# Patient Record
Sex: FEMALE | Race: WHITE | NOT HISPANIC OR LATINO | Employment: OTHER | URBAN - METROPOLITAN AREA
[De-identification: names, ages, dates, MRNs, and addresses within clinical notes are randomized per-mention and may not be internally consistent; named-entity substitution may affect disease eponyms.]

---

## 2017-05-22 ENCOUNTER — HOSPITAL ENCOUNTER (OUTPATIENT)
Dept: MAMMOGRAPHY | Facility: CLINIC | Age: 57
Discharge: HOME | End: 2017-05-22
Attending: OBSTETRICS & GYNECOLOGY | Admitting: OBSTETRICS & GYNECOLOGY
Payer: COMMERCIAL

## 2017-05-22 DIAGNOSIS — Z12.31 VISIT FOR SCREENING MAMMOGRAM: ICD-10-CM

## 2017-05-22 PROCEDURE — G0202 SCR MAMMO BI INCL CAD: HCPCS

## 2018-06-25 ENCOUNTER — HOSPITAL ENCOUNTER (OUTPATIENT)
Dept: MAMMOGRAPHY | Facility: CLINIC | Age: 58
Discharge: HOME | End: 2018-06-25
Attending: OBSTETRICS & GYNECOLOGY | Admitting: OBSTETRICS & GYNECOLOGY
Payer: COMMERCIAL

## 2018-06-25 DIAGNOSIS — Z12.31 VISIT FOR SCREENING MAMMOGRAM: ICD-10-CM

## 2018-06-25 PROCEDURE — 77063 BREAST TOMOSYNTHESIS BI: CPT

## 2023-05-10 ENCOUNTER — LAB REQUISITION (OUTPATIENT)
Dept: LAB | Facility: CLINIC | Age: 63
End: 2023-05-10
Payer: COMMERCIAL

## 2023-05-10 DIAGNOSIS — L08.9 LOCAL INFECTION OF THE SKIN AND SUBCUTANEOUS TISSUE, UNSPECIFIED: ICD-10-CM

## 2023-05-10 PROCEDURE — 87081 CULTURE SCREEN ONLY: CPT | Mod: ORL | Performed by: DERMATOLOGY

## 2023-05-13 LAB — BACTERIA SPEC CULT: NORMAL

## 2024-07-03 ENCOUNTER — ANCILLARY PROCEDURE (OUTPATIENT)
Dept: MAMMOGRAPHY | Facility: CLINIC | Age: 64
End: 2024-07-03
Attending: FAMILY MEDICINE
Payer: COMMERCIAL

## 2024-07-03 DIAGNOSIS — Z12.31 BREAST CANCER SCREENING BY MAMMOGRAM: ICD-10-CM

## 2024-07-03 PROCEDURE — 77063 BREAST TOMOSYNTHESIS BI: CPT | Mod: TC | Performed by: RADIOLOGY

## 2024-07-03 PROCEDURE — 77067 SCR MAMMO BI INCL CAD: CPT | Mod: TC | Performed by: RADIOLOGY

## 2024-07-16 ENCOUNTER — ANCILLARY PROCEDURE (OUTPATIENT)
Dept: ULTRASOUND IMAGING | Facility: CLINIC | Age: 64
End: 2024-07-16
Attending: FAMILY MEDICINE
Payer: COMMERCIAL

## 2024-07-16 ENCOUNTER — ANCILLARY PROCEDURE (OUTPATIENT)
Dept: MAMMOGRAPHY | Facility: CLINIC | Age: 64
End: 2024-07-16
Attending: FAMILY MEDICINE
Payer: COMMERCIAL

## 2024-07-16 DIAGNOSIS — R92.8 ABNORMAL MAMMOGRAM: ICD-10-CM

## 2024-07-16 PROCEDURE — 77065 DX MAMMO INCL CAD UNI: CPT | Mod: RT

## 2024-07-16 PROCEDURE — 76642 ULTRASOUND BREAST LIMITED: CPT | Mod: RT

## 2024-07-16 PROCEDURE — G0279 TOMOSYNTHESIS, MAMMO: HCPCS

## 2024-07-22 ENCOUNTER — ANCILLARY PROCEDURE (OUTPATIENT)
Dept: MAMMOGRAPHY | Facility: CLINIC | Age: 64
End: 2024-07-22
Attending: FAMILY MEDICINE
Payer: COMMERCIAL

## 2024-07-22 ENCOUNTER — ANCILLARY PROCEDURE (OUTPATIENT)
Dept: ULTRASOUND IMAGING | Facility: CLINIC | Age: 64
End: 2024-07-22
Attending: FAMILY MEDICINE
Payer: COMMERCIAL

## 2024-07-22 DIAGNOSIS — R92.8 ABNORMAL MAMMOGRAM: ICD-10-CM

## 2024-07-22 PROCEDURE — 77066 DX MAMMO INCL CAD BI: CPT | Performed by: RADIOLOGY

## 2024-07-22 PROCEDURE — 88341 IMHCHEM/IMCYTCHM EA ADD ANTB: CPT | Performed by: PATHOLOGY

## 2024-07-22 PROCEDURE — 88360 TUMOR IMMUNOHISTOCHEM/MANUAL: CPT | Performed by: PATHOLOGY

## 2024-07-22 PROCEDURE — 88305 TISSUE EXAM BY PATHOLOGIST: CPT | Performed by: PATHOLOGY

## 2024-07-22 PROCEDURE — 88342 IMHCHEM/IMCYTCHM 1ST ANTB: CPT | Mod: XS | Performed by: PATHOLOGY

## 2024-07-22 PROCEDURE — G0279 TOMOSYNTHESIS, MAMMO: HCPCS | Performed by: RADIOLOGY

## 2024-07-22 PROCEDURE — 19083 BX BREAST 1ST LESION US IMAG: CPT | Mod: RT | Performed by: RADIOLOGY

## 2024-07-22 RX ORDER — IOPAMIDOL 755 MG/ML
90 INJECTION, SOLUTION INTRAVASCULAR ONCE
Status: COMPLETED | OUTPATIENT
Start: 2024-07-22 | End: 2024-07-22

## 2024-07-22 RX ADMIN — IOPAMIDOL 90 ML: 755 INJECTION, SOLUTION INTRAVASCULAR at 15:42

## 2024-07-25 ENCOUNTER — TELEPHONE (OUTPATIENT)
Dept: MAMMOGRAPHY | Facility: CLINIC | Age: 64
End: 2024-07-25
Payer: COMMERCIAL

## 2024-07-25 LAB
PATH REPORT.COMMENTS IMP SPEC: ABNORMAL
PATH REPORT.COMMENTS IMP SPEC: YES
PATH REPORT.FINAL DX SPEC: ABNORMAL
PATH REPORT.GROSS SPEC: ABNORMAL
PATH REPORT.MICROSCOPIC SPEC OTHER STN: ABNORMAL
PATH REPORT.RELEVANT HX SPEC: ABNORMAL
PATHOLOGY SYNOPTIC REPORT: ABNORMAL
PHOTO IMAGE: ABNORMAL

## 2024-07-26 NOTE — TELEPHONE ENCOUNTER
Spoke with patient regarding right breast biopsy results, which indicate invasive lobular carcinoma. Notified patient that the radiologist's recommendation is surgical and oncologic consultations. Patient verbalized understanding of these results and all questions answered to her satisfaction. She is scheduled for consultations with Dr. Boles and Dr. Mcintosh on 7/31.

## 2024-07-29 NOTE — TELEPHONE ENCOUNTER
RECORDS STATUS - BREAST    RECORDS REQUESTED FROM: [facility]/Kelsie   NOTES DETAILS STATUS   OFFICE NOTE from referring provider SHIVA Iglesias 6/18/24: Dr. Annetta Turk   OPERATIVE REPORT Epic 7/22/24: US Breast Bx   MEDICATION LIST [facility]    LABS     PATHOLOGY REPORTS  (Tissue diagnosis, Stage, ER/UT percentage positive and intensity of staining, HER2 IHC, FISH, and all biopsies from breast and any distant metastasis)                 Report in Epic 7/22/24: [id number]    IMAGING (NEED IMAGES & REPORT)     MAMMO PACS 7/22/24-6/25/18   ULTRASOUND PACS 7/22/24: US Breast Bx  7/16/24: US Breast

## 2024-07-31 ENCOUNTER — OFFICE VISIT (OUTPATIENT)
Dept: ONCOLOGY | Facility: CLINIC | Age: 64
End: 2024-07-31
Payer: COMMERCIAL

## 2024-07-31 ENCOUNTER — OFFICE VISIT (OUTPATIENT)
Dept: SURGERY | Facility: CLINIC | Age: 64
End: 2024-07-31
Payer: COMMERCIAL

## 2024-07-31 ENCOUNTER — PRE VISIT (OUTPATIENT)
Dept: ONCOLOGY | Facility: CLINIC | Age: 64
End: 2024-07-31

## 2024-07-31 VITALS
HEIGHT: 63 IN | BODY MASS INDEX: 28.79 KG/M2 | WEIGHT: 162.5 LBS | SYSTOLIC BLOOD PRESSURE: 128 MMHG | HEART RATE: 68 BPM | DIASTOLIC BLOOD PRESSURE: 81 MMHG

## 2024-07-31 VITALS
SYSTOLIC BLOOD PRESSURE: 128 MMHG | HEART RATE: 68 BPM | HEIGHT: 63 IN | BODY MASS INDEX: 28.79 KG/M2 | WEIGHT: 162.5 LBS | DIASTOLIC BLOOD PRESSURE: 1 MMHG

## 2024-07-31 DIAGNOSIS — Z17.0 MALIGNANT NEOPLASM OF UPPER-OUTER QUADRANT OF RIGHT BREAST IN FEMALE, ESTROGEN RECEPTOR POSITIVE (H): Primary | ICD-10-CM

## 2024-07-31 DIAGNOSIS — C50.411 MALIGNANT NEOPLASM OF UPPER-OUTER QUADRANT OF RIGHT BREAST IN FEMALE, ESTROGEN RECEPTOR POSITIVE (H): Primary | ICD-10-CM

## 2024-07-31 PROCEDURE — 99205 OFFICE O/P NEW HI 60 MIN: CPT | Performed by: SURGERY

## 2024-07-31 PROCEDURE — 99205 OFFICE O/P NEW HI 60 MIN: CPT | Performed by: INTERNAL MEDICINE

## 2024-07-31 PROCEDURE — 99417 PROLNG OP E/M EACH 15 MIN: CPT | Performed by: SURGERY

## 2024-07-31 RX ORDER — SERTRALINE HYDROCHLORIDE 25 MG/1
25 TABLET, FILM COATED ORAL DAILY
COMMUNITY

## 2024-07-31 RX ORDER — ACETAMINOPHEN 325 MG/1
975 TABLET ORAL ONCE
Status: CANCELLED | OUTPATIENT
Start: 2024-07-31 | End: 2024-07-31

## 2024-07-31 NOTE — NURSING NOTE
"Oncology Rooming Note    July 31, 2024 1:12 PM   Namita Altman is a 64 year old female who presents for:    Chief Complaint   Patient presents with    Consult     Breast cancer     Initial Vitals: BP (!) 128/1   Pulse 68   Ht 1.6 m (5' 3\")   Wt 73.7 kg (162 lb 8 oz)   BMI 28.79 kg/m   Estimated body mass index is 28.79 kg/m  as calculated from the following:    Height as of this encounter: 1.6 m (5' 3\").    Weight as of this encounter: 73.7 kg (162 lb 8 oz). Body surface area is 1.81 meters squared.  Data Unavailable Comment: Data Unavailable   No LMP recorded.  Allergies reviewed: Yes  Medications reviewed: Yes    Medications: MEDICATION REFILLS NEEDED TODAY. Provider was NOT notified.  Pharmacy name entered into [facility]: [id number] [place], [place] - [address] [place] [address]    Marli Hernandez LPN             "

## 2024-07-31 NOTE — NURSING NOTE
"Namita Altman's goals for this visit include:   Chief Complaint   Patient presents with    Consult     Invasive lobular carcinoma       She requests these members of her care team be copied on today's visit information: Yes    PCP: Annetta Turk    Referring Provider:  Referred Self, MD  No address on file    /81   Pulse 68   Ht 1.6 m (5' 3\")   Wt 73.7 kg (162 lb 8 oz)   BMI 28.79 kg/m      Do you need any medication refills at today's visit? No    Marli Hernandez LPN      "

## 2024-07-31 NOTE — NURSING NOTE
Breast Nurse Care Coordination:       I introduced self to patient and  and explained my role of breast nurse coordinator. I visited with Namita at her surgical consultation on 7/31/24 with Dr. Boles at the [facility]         Namita was given the new breast cancer patient packet which includes educational material and support resources such as American Cancer Society, Fighting Cancer through Diet and Lifestyle, [employer], [employer]'s [employer], and Pathways. I also enclosed a copy of her right breast pathology report dated 7/25/24      At the end of the consultation, we reviewed Namita's  plan of care and education. The plan is for patient to meet with  today.  Namita is leaning towards a Lumpectomy     I informed her for surgery she will need a pre-op exam within 30 days before surgery. Patient notified that surgery pathology results will be reviewed in clinic at Namita's first post operative visit with Dr. Boles about two weeks after surgery. Surgery packet reviewed with patient and surgery soap provided      I gave patient [facility] educational materials regarding Lumpectomy, and [place] Lymph Node Biopsy. Informed patient for Breast surgery, she will want to have a comfortable supportive bra that opens in the front to wear 24/7- 2 weeks following her surgery. I gave patient information on different options to purchase post surgery bras and garments     I answered her questions and encouraged patient to call me back with any future questions or concerns.  Namita has my contact information, and knows to contact me in the future with any questions or concerns.     Magalie Suh [facility]  Surgical Oncology, Plastics and General Surgery  [facility]

## 2024-07-31 NOTE — PROGRESS NOTES
"[facility]  [id number] [place] [id number]  Phone: [phone]    PATIENT NAME:  Namita Altman  PATIENT YOB: 1960    NEW SURGICAL CONSULTATION  Jul 31, 2024    Namita Altman is a 64 year old woman who presents with a right  breast complaint.  She was referred by Gemini Ferro MD.    HPI:    She notes no masses in either breast, axilla, or neck. She denies any nipple discharge or nipple inversion.     Imaging showed a 1.3 cm mass at 11:00 10 cm FN in the RIGHT breast.    A biopsy was performed and a globe clip was placed.  It showed invasive lobular carcinoma, grade 1, ER+ [place]- HER2-.      BREAST-SPECIFIC HISTORY:  Prior breast surgeries: No  Prior radiation history: No  Bra size: wears sports bra 34/36 D  Dominant hand: Right    Right upper extremity melanoma (Dr Esteves [facility] - unsure what stage) - did not have lymph nodes removed.    FAMILY HISTORY:  Breast ca: No  Ovarian ca: No  Pancreatic ca: No  Melanoma: Yes - brother  Gastric ca: No  Colon ca: No  Other cancer: No    Patient Active Problem List   Diagnosis    Malignant neoplasm of upper-outer quadrant of right breast in female, estrogen receptor positive (H)        No past medical history on file.  RA on abatacept  Psoriasis  Melanoma right upper extremity (2020)  No HTN, DM, MI, CVA    No past surgical history on file.  Endocervical bx - was under GA  No GA issues    Current Outpatient Medications   Medication Sig Dispense Refill    Abatacept (ORENCIA IV)       sertraline (ZOLOFT) 25 MG tablet Take 25 mg by mouth daily      Last received a month ago (was due for it today 7/31)       Allergies   Allergen Reactions    Penicillins         SOCIAL HISTORY:  Smokes: No  Occupation: self-employed and also works at [employer]; heavy lifting at [employer]    ROS:  Easy bruising/bleeding: No  History of DVT/PE: No    /81   Pulse 68   Ht 1.6 m (5' 3\")   Wt 73.7 kg (162 lb 8 oz)   BMI " 28.79 kg/m     Physical Exam  Constitutional:       Appearance: She is well-developed.   Chest:   Breasts:     Breasts are symmetrical.      Right: Mass present. No inverted nipple, nipple discharge, skin change or tenderness.      Left: No inverted nipple, mass, nipple discharge, skin change or tenderness.          Comments: Patient was examined in both supine and upright positions.   Lymphadenopathy:      Cervical: No cervical adenopathy.      Right cervical: No superficial, deep or posterior cervical adenopathy.     Left cervical: No superficial, deep or posterior cervical adenopathy.      Upper Body:      Right upper body: No supraclavicular, axillary or pectoral adenopathy.      Left upper body: No supraclavicular, axillary or pectoral adenopathy.      Comments: No lymphedema in bilateral upper extremities.   Skin:     General: Skin is warm and dry.          INVESTIGATIONS:    Contrast-Enhanced Mammogram (7/22/2024) showed:  FINDINGS:  BREAST DENSITY: The breasts are heterogeneously dense which may obscure small masses.  Minimal background parenchymal enhancement. No suspicious enhancement in either breast. The spiculated mass in the right breast at 11:00 posterior depth is visualized on low energy views.  IMPRESSION: BI-RADS CATEGORY: 4 - Suspicious.    Diagnostic Mammogram & Ultrasound (7/16/2024) showed:  BREAST DENSITY: The breasts are heterogeneously dense which may obscure small masses.  FINDINGS:    Additional mammographic images were obtained for further evaluation of possible asymmetry in the right breast on the CC view, lateral position. Additional views confirm an irregular hyperdense mass with angulated margins at the 11:00 position right breast, posterior depth.  Targeted right breast ultrasound was performed demonstrating an irregular hypoechoic shadowing mass with spiculated margins at the 11:00 position, 10 cm from the nipple measuring 1.3 x 0.9 x 0.7 cm. No suspicious right axillary lymph  node..  IMPRESSION: BI-RADS CATEGORY: 4 - Suspicious Abnormality-Biopsy Should Be Considered    Screening Mammogram (7/3/2024) showed:  Findings: The breasts are heterogeneously dense, which may obscure small masses.  There is a possible asymmetry in the right breast on the CC view lateral position, posterior depth. Possible correlate in superior breast on MLO view.   There is no suspicious finding of the left breast.  IMPRESSION: BI-RADS CATEGORY: 0 - Incomplete - Need additional imaging evaluation and/or prior mammograms for comparison    Biopsy (7/22/2024) showed:  Final Diagnosis   RIGHT breast, 11:00, 10 cm from nipple, ultrasound guided core biopsy:  -INVASIVE LOBULAR CARCINOMA, Emanuel grade 1  -Calcifications associated with invasive carcinoma  -Invasive carcinoma is estrogen receptor positive, progesterone receptor negative and HER2 negative (score 0) by immunohistochemistry (see biomarker reporting template below)     ASSESSMENT:  Namita Altman is a 64 year old woman with RIGHT breast cancer.    Her stage is:   Cancer Staging   Malignant neoplasm of upper-outer quadrant of right breast in female, estrogen receptor positive (H)  Staging form: Breast, AJCC 8th Edition  - Clinical: Stage IA (cT1c, cN0, cM0, G1, ER+, [place]-, HER2-) - Unsigned    I personally reviewed the imaging above.    I reviewed the imaging, diagnosis, staging, and management (including surgery, chemotherapy/systemic therapy, radiation therapy, and endocrine therapy) of breast cancer with Namita Altman and her . A copy of the biopsy pathology report was also provided.    The mainstay of treatment for resectable breast cancer is surgical resection, in the form of either breast conservation (segmental mastectomy plus radiation) or mastectomy.  We reviewed that the two strategies are equivalent in terms of overall survival.  The advantages and disadvantages of each were discussed.    The surgeries are performed as day surgeries.  Namita GORDON  "Anupama IS a candidate for breast conservation therapy.  We discussed that this involves two necessary components: the lumpectomy (or \"segmental mastectomy\"), and several weeks of whole breast radiation therapy.  We discussed that the overall survival after breast conservation therapy is identical to mastectomy and that local recurrence rates are significantly higher if segmental mastectomy was performed without subsequent radiation.  We also discussed the significance of clear margins and that a subsequent procedure may be necessary to achieve this.    Because the lesion is ill-defined, a radiofrequency identification (RFID) seed-localized approach would be taken for breast conservation.  She would present prior to surgery for an image-guided RFID seed placement.  The risks of a RFID seed-localized segmental mastectomy were discussed with the patient and family, including the risks of bleeding, wound infection, wound dehiscence, and post-operative contour change to the breast.  We discussed obtaining a supportive bra for postoperative recovery and that prescriptions for narcotics are not typically provided for this procedure.    We did not discuss the alternative of mastectomy in great detail as Namita Altman was not interested in this approach upon hearing that the survival outcomes are equivalent to breast conservation.       In addition to the surgical management of the breast, a sentinel lymph node biopsy is recommended for mounika staging of the axilla.  This is performed with the combination of the radioactive Tilmanocept and dye (lymphazurin or indocyanine green). The risks of a sentinel lymph node biopsy were discussed with the patient and family, including the risks of lymphedema (5%), bleeding, wound infection, wound dehiscence, seroma formation, and paresthesias. There is an approximately 10% false negative rate associated with sentinel lymph node biopsy as published in the literature.  The findings of the " sentinel lymph node biopsy may result in the need for further treatment. There is a 1-2% chance of patients whose sentinel lymph nodes do not map despite dual tracer (radioactive Tilmanocept and dye).     We discussed that surgical pathology results will be reviewed at the postoperative visit to allow for careful discussion of next steps and for answering questions.    Finally, we reviewed that as part of team-based approach to breast cancer, medical oncology and radiation oncology referrals may also be made to discuss systemic/endocrine therapy and radiation therapy.  The decision regarding adjuvant systemic therapy will be made after surgery, depending on Oncotype DX results. We reviewed that this can take up to 4 weeks after surgery to result.  She is meeting with Dr Mcintosh of medical oncology today. I personally discussed her case with him.    We reviewed the genetic testing guidelines by the American Society of Breast Surgeons from February 2019.  I have recommended genetic testing and counseling.  The natural history of pathogenic variants in genes like BRCA and breast cancer were discussed with the patient. Should a pathogenic variant be identified, she may  no longer be a good candidate for breast conservation.  We also reviewed the risk reduction benefits of a contralateral mastectomy in this situation. Namita Altman declined to undergo genetic testing at this time but will contact us if she changes her mind.    Finally, her abatacept will need to be held perioperatively. Surgery should be at least 4 weeks after the last dose. She will be seeing her rheumatologist next Monday.    All of the above was discussed with Namita Altman and all questions were answered.  She elected to proceed with RIGHT RFID seed-localized segmental mastectomy and axillary sentinel lymph node mapping and biopsy.    Total time spent with the patient was 60 minutes, of which 75% was counseling.     PLAN:  Hold abatacept; surgery  ideally should be at least 4 weeks after last abatacept  RIGHT RFID seed-localized segmental mastectomy   RIGHT axillary sentinel lymph node mapping and biopsy   Patient to report to her PCP for preoperative H&P and testing.  Consultation with Dr Arnaldo Boles MD [facility] FACS  Associate Professor of Surgery  Division of Surgical Oncology  [facility]     75 minutes spent on the date of the encounter doing chart review, review of outside records, review of test result(s), interpretation of test(s), patient visit, documentation, care coordination, discussion with other physician(s), and discussion with family.

## 2024-07-31 NOTE — LETTER
2024      Namita Altman  [address] [facility] [address]      Dear Colleague,    Thank you for referring your patient, Namita Altman, to the [facility] [place]. Please see a copy of my visit note below.    [facility] / Oncology  Initial Visit / Consultation Note 2024  Name: Namita Altman  :  1960  MRN:  [id number]    --------------------    Assessment / Plan:  Clinical stage IA hormone positive (ER 98% strong, SC negative), HER2 negative, invasive lobular carcinoma of the right breast  Heterogeneously dense breast tissue  Rheumatoid arthritis maintained on biologic therapy  Melanoma RUE.    Over the course of our visit, Namita, her  and I reviewed plans for upfront surgical lumpectomy with sentinel lymph node biopsy with Dr. Boles.  Following surgical resection and review of her pathology report, we reviewed the use of an Oncotype DX score to determine the role of adjuvant chemotherapy.  With her grade 1 disease as well as lobular histology, we are hopeful she will not require adjuvant chemotherapy.  The role of adjuvant radiotherapy to reduce local regional recurrence and walk through some of the potential logistics as well as side effects and reviewed the potential for omission albeit she is quite young but has concerns of radiation related toxicity..  I do not have any great concerns with her rheumatoid arthritis and the potential for radiation related issues.  Following completion of adjuvant radiotherapy, we reviewed the role of adjuvant endocrine therapy using an aromatase inhibitor for 10 years given her lobular histology.  We reviewed potential for vasomotor symptoms, arthralgias, body changes, cognitive changes, mood changes, cardiovascular changes as well as bone thinning.  Bone health we have a portion of our visit moving forward.  We will plan to see her back 3 to 4 weeks time postoperatively with a completed pathology report  "and Oncotype.    Thank you kindly for this consultation.  Clint Mcintosh MD    --------------------    Interval History:  Namita present for evaluation of breast cancer accompanied by her .  Routine screening mammogram July 2024 with a possible asymmetry in the right breast, posterior depth with no suspicious findings in the left breast.  Of note the breasts were described as heterogeneously dense breast tissue.  Diagnostic breast mammogram confirmed the irregular hyperdense mass with angulated margins at the 11 o'clock position of the right breast, posterior depth.  Targeted right breast ultrasound demonstrated an irregular, hypoechoic shadowing mass with spiculated margins at the 11 o'clock position 10 cm from the nipple measuring 1.3 x 0.9 x 0.7 cm with no suspicious right axillary lymph nodes noted.  Right breast biopsy at 11 o'clock position 10 cm from the nipple revealed grade 1 invasive lobular carcinoma with associated calcifications, estrogen receptor positive, progesterone receptor negative, HER2 negative.  Contrast-enhanced mammogram revealed heterogeneously dense breast tissue with minimal background parenchymal enhancement and no suspicious enhancement in either breast with the spiculated mass of the right breast 11 o'clock position, posterior depth showing visualization on low energy views    --------------------    Review of Systems:  10 point ROS negative except for that above.    Past Medical / Surgical History:  Patient Active Problem List   Diagnosis     Malignant neoplasm of upper-outer quadrant of right breast in female, estrogen receptor positive (H)       Family History:  Brother w/ melanoma.    Social History:  Tobacco: No.  Etoh: No.  Drugs: No.    Medications / Allergies:  Reviewed in EMR.    --------------------    Physical Exam:  VS: BP (!) 128/1   Pulse 68   Ht 1.6 m (5' 3\")   Wt 73.7 kg (162 lb 8 oz)   BMI 28.79 kg/m    GEN: Well appearing.    Labs / Imaging / " Path:  Reviewed breast bx path report  Reviewed screening and diagnostic and contrast mammogram and breast ultrasound.      Again, thank you for allowing me to participate in the care of your patient.        Sincerely,        Clint Mcintosh MD

## 2024-07-31 NOTE — LETTER
7/31/2024      Namita Altman  [address] [facility] [address]      Dear Colleague,    Thank you for referring your patient, Namita Altman, to the [facility]. Please see a copy of my visit note below.      [facility]  [id number] [place] [id number]  Phone: [phone]    PATIENT NAME:  Namita Altman  PATIENT YOB: 1960    NEW SURGICAL CONSULTATION  Jul 31, 2024    Namita Altman is a 64 year old woman who presents with a right  breast complaint.  She was referred by Gemini Ferro MD.    HPI:    She notes no masses in either breast, axilla, or neck. She denies any nipple discharge or nipple inversion.     Imaging showed a 1.3 cm mass at 11:00 10 cm FN in the RIGHT breast.    A biopsy was performed and a globe clip was placed.  It showed invasive lobular carcinoma, grade 1, ER+ TX- HER2-.      BREAST-SPECIFIC HISTORY:  Prior breast surgeries: No  Prior radiation history: No  Bra size: wears sports bra 34/36 D  Dominant hand: Right    Right upper extremity melanoma (Dr Esteves at [facility] - unsure what stage) - did not have lymph nodes removed.    FAMILY HISTORY:  Breast ca: No  Ovarian ca: No  Pancreatic ca: No  Melanoma: Yes - brother  Gastric ca: No  Colon ca: No  Other cancer: No    Patient Active Problem List   Diagnosis     Malignant neoplasm of upper-outer quadrant of right breast in female, estrogen receptor positive (H)        No past medical history on file.  RA on abatacept  Psoriasis  Melanoma right upper extremity (2020)  No HTN, DM, MI, CVA    No past surgical history on file.  Endocervical bx - was under GA  No GA issues    Current Outpatient Medications   Medication Sig Dispense Refill     Abatacept (ORENCIA IV)        sertraline (ZOLOFT) 25 MG tablet Take 25 mg by mouth daily      Last received a month ago (was due for it today 7/31)       Allergies   Allergen Reactions     Penicillins         SOCIAL  "HISTORY:  Smokes: No  Occupation: self-employed and also works at [employer]; heavy lifting at [employer]    ROS:  Easy bruising/bleeding: No  History of DVT/PE: No    /81   Pulse 68   Ht 1.6 m (5' 3\")   Wt 73.7 kg (162 lb 8 oz)   BMI 28.79 kg/m     Physical Exam  Constitutional:       Appearance: She is well-developed.   Chest:   Breasts:     Breasts are symmetrical.      Right: Mass present. No inverted nipple, nipple discharge, skin change or tenderness.      Left: No inverted nipple, mass, nipple discharge, skin change or tenderness.          Comments: Patient was examined in both supine and upright positions.   Lymphadenopathy:      Cervical: No cervical adenopathy.      Right cervical: No superficial, deep or posterior cervical adenopathy.     Left cervical: No superficial, deep or posterior cervical adenopathy.      Upper Body:      Right upper body: No supraclavicular, axillary or pectoral adenopathy.      Left upper body: No supraclavicular, axillary or pectoral adenopathy.      Comments: No lymphedema in bilateral upper extremities.   Skin:     General: Skin is warm and dry.          INVESTIGATIONS:    Contrast-Enhanced Mammogram (7/22/2024) showed:  FINDINGS:  BREAST DENSITY: The breasts are heterogeneously dense which may obscure small masses.  Minimal background parenchymal enhancement. No suspicious enhancement in either breast. The spiculated mass in the right breast at 11:00 posterior depth is visualized on low energy views.  IMPRESSION: BI-RADS CATEGORY: 4 - Suspicious.    Diagnostic Mammogram & Ultrasound (7/16/2024) showed:  BREAST DENSITY: The breasts are heterogeneously dense which may obscure small masses.  FINDINGS:    Additional mammographic images were obtained for further evaluation of possible asymmetry in the right breast on the CC view, lateral position. Additional views confirm an irregular hyperdense mass with angulated margins at the 11:00 position right breast, posterior " depth.  Targeted right breast ultrasound was performed demonstrating an irregular hypoechoic shadowing mass with spiculated margins at the 11:00 position, 10 cm from the nipple measuring 1.3 x 0.9 x 0.7 cm. No suspicious right axillary lymph node..  IMPRESSION: BI-RADS CATEGORY: 4 - Suspicious Abnormality-Biopsy Should Be Considered    Screening Mammogram (7/3/2024) showed:  Findings: The breasts are heterogeneously dense, which may obscure small masses.  There is a possible asymmetry in the right breast on the CC view lateral position, posterior depth. Possible correlate in superior breast on MLO view.   There is no suspicious finding of the left breast.  IMPRESSION: BI-RADS CATEGORY: 0 - Incomplete - Need additional imaging evaluation and/or prior mammograms for comparison    Biopsy (7/22/2024) showed:  Final Diagnosis   RIGHT breast, 11:00, 10 cm from nipple, ultrasound guided core biopsy:  -INVASIVE LOBULAR CARCINOMA, Emanuel grade 1  -Calcifications associated with invasive carcinoma  -Invasive carcinoma is estrogen receptor positive, progesterone receptor negative and HER2 negative (score 0) by immunohistochemistry (see biomarker reporting template below)     ASSESSMENT:  Namita Altman is a 64 year old woman with RIGHT breast cancer.    Her stage is:   Cancer Staging   Malignant neoplasm of upper-outer quadrant of right breast in female, estrogen receptor positive (H)  Staging form: Breast, AJCC 8th Edition  - Clinical: Stage IA (cT1c, cN0, cM0, G1, ER+, AR-, HER2-) - Unsigned    I personally reviewed the imaging above.    I reviewed the imaging, diagnosis, staging, and management (including surgery, chemotherapy/systemic therapy, radiation therapy, and endocrine therapy) of breast cancer with Namita Altman and her . A copy of the biopsy pathology report was also provided.    The mainstay of treatment for resectable breast cancer is surgical resection, in the form of either breast conservation  "(segmental mastectomy plus radiation) or mastectomy.  We reviewed that the two strategies are equivalent in terms of overall survival.  The advantages and disadvantages of each were discussed.    The surgeries are performed as day surgeries.  Namita Altman IS a candidate for breast conservation therapy.  We discussed that this involves two necessary components: the lumpectomy (or \"segmental mastectomy\"), and several weeks of whole breast radiation therapy.  We discussed that the overall survival after breast conservation therapy is identical to mastectomy and that local recurrence rates are significantly higher if segmental mastectomy was performed without subsequent radiation.  We also discussed the significance of clear margins and that a subsequent procedure may be necessary to achieve this.    Because the lesion is ill-defined, a radiofrequency identification (RFID) seed-localized approach would be taken for breast conservation.  She would present prior to surgery for an image-guided RFID seed placement.  The risks of a RFID seed-localized segmental mastectomy were discussed with the patient and family, including the risks of bleeding, wound infection, wound dehiscence, and post-operative contour change to the breast.  We discussed obtaining a supportive bra for postoperative recovery and that prescriptions for narcotics are not typically provided for this procedure.    We did not discuss the alternative of mastectomy in great detail as Namita Altman was not interested in this approach upon hearing that the survival outcomes are equivalent to breast conservation.       In addition to the surgical management of the breast, a sentinel lymph node biopsy is recommended for mounika staging of the axilla.  This is performed with the combination of the radioactive Tilmanocept and dye (lymphazurin or indocyanine green). The risks of a sentinel lymph node biopsy were discussed with the patient and family, including the risks " of lymphedema (5%), bleeding, wound infection, wound dehiscence, seroma formation, and paresthesias. There is an approximately 10% false negative rate associated with sentinel lymph node biopsy as published in the literature.  The findings of the sentinel lymph node biopsy may result in the need for further treatment. There is a 1-2% chance of patients whose sentinel lymph nodes do not map despite dual tracer (radioactive Tilmanocept and dye).     We discussed that surgical pathology results will be reviewed at the postoperative visit to allow for careful discussion of next steps and for answering questions.    Finally, we reviewed that as part of team-based approach to breast cancer, medical oncology and radiation oncology referrals may also be made to discuss systemic/endocrine therapy and radiation therapy.  The decision regarding adjuvant systemic therapy will be made after surgery, depending on Oncotype DX results. We reviewed that this can take up to 4 weeks after surgery to result.  She is meeting with Dr Mcintosh of medical oncology today. I personally discussed her case with him.    We reviewed the genetic testing guidelines by the American Society of Breast Surgeons from February 2019.  I have recommended genetic testing and counseling.  The natural history of pathogenic variants in genes like BRCA and breast cancer were discussed with the patient. Should a pathogenic variant be identified, she may  no longer be a good candidate for breast conservation.  We also reviewed the risk reduction benefits of a contralateral mastectomy in this situation. Namita Altman declined to undergo genetic testing at this time but will contact us if she changes her mind.    Finally, her abatacept will need to be held perioperatively. Surgery should be at least 4 weeks after the last dose. She will be seeing her rheumatologist next Monday.    All of the above was discussed with Namita GORDON Anupama and all questions were answered.   She elected to proceed with RIGHT RFID seed-localized segmental mastectomy and axillary sentinel lymph node mapping and biopsy.    Total time spent with the patient was 60 minutes, of which 75% was counseling.     PLAN:  Hold abatacept; surgery ideally should be at least 4 weeks after last abatacept  RIGHT RFID seed-localized segmental mastectomy   RIGHT axillary sentinel lymph node mapping and biopsy   Patient to report to her PCP for preoperative H&P and testing.  Consultation with Dr Arnaldo Boles MD MS Located within [facility] FACS  Associate Professor of Surgery  Division of Surgical Oncology  [facility]     75 minutes spent on the date of the encounter doing chart review, review of outside records, review of test result(s), interpretation of test(s), patient visit, documentation, care coordination, discussion with other physician(s), and discussion with family.      Again, thank you for allowing me to participate in the care of your patient.        Sincerely,        Magdalena Boles MD

## 2024-08-02 ENCOUNTER — TELEPHONE (OUTPATIENT)
Dept: ONCOLOGY | Facility: CLINIC | Age: 64
End: 2024-08-02
Payer: COMMERCIAL

## 2024-08-02 NOTE — PROGRESS NOTES
[facility] Hematology / Oncology  Initial Visit / Consultation Note 2024  Name: Namita Altman  :  1960  MRN:  [id number]    --------------------    Assessment / Plan:  Clinical stage IA hormone positive (ER 98% strong, PA negative), HER2 negative, invasive lobular carcinoma of the right breast  Heterogeneously dense breast tissue  Rheumatoid arthritis maintained on biologic therapy  Melanoma RUE.    Over the course of our visit, Namita, her  and I reviewed plans for upfront surgical lumpectomy with sentinel lymph node biopsy with Dr. Boles.  Following surgical resection and review of her pathology report, we reviewed the use of an Oncotype DX score to determine the role of adjuvant chemotherapy.  With her grade 1 disease as well as lobular histology, we are hopeful she will not require adjuvant chemotherapy.  The role of adjuvant radiotherapy to reduce local regional recurrence and walk through some of the potential logistics as well as side effects and reviewed the potential for omission albeit she is quite young but has concerns of radiation related toxicity..  I do not have any great concerns with her rheumatoid arthritis and the potential for radiation related issues.  Following completion of adjuvant radiotherapy, we reviewed the role of adjuvant endocrine therapy using an aromatase inhibitor for 10 years given her lobular histology.  We reviewed potential for vasomotor symptoms, arthralgias, body changes, cognitive changes, mood changes, cardiovascular changes as well as bone thinning.  Bone health we have a portion of our visit moving forward.  We will plan to see her back 3 to 4 weeks time postoperatively with a completed pathology report and Oncotype.    Thank you kindly for this consultation.  Clint Mcintosh MD    --------------------    Interval History:  Namita present for evaluation of breast cancer accompanied by her .  Routine screening mammogram 2024 with a  "possible asymmetry in the right breast, posterior depth with no suspicious findings in the left breast.  Of note the breasts were described as heterogeneously dense breast tissue.  Diagnostic breast mammogram confirmed the irregular hyperdense mass with angulated margins at the 11 o'clock position of the right breast, posterior depth.  Targeted right breast ultrasound demonstrated an irregular, hypoechoic shadowing mass with spiculated margins at the 11 o'clock position 10 cm from the nipple measuring 1.3 x 0.9 x 0.7 cm with no suspicious right axillary lymph nodes noted.  Right breast biopsy at 11 o'clock position 10 cm from the nipple revealed grade 1 invasive lobular carcinoma with associated calcifications, estrogen receptor positive, progesterone receptor negative, HER2 negative.  Contrast-enhanced mammogram revealed heterogeneously dense breast tissue with minimal background parenchymal enhancement and no suspicious enhancement in either breast with the spiculated mass of the right breast 11 o'clock position, posterior depth showing visualization on low energy views    --------------------    Review of Systems:  10 point ROS negative except for that above.    Past Medical / Surgical History:  Patient Active Problem List   Diagnosis    Malignant neoplasm of upper-outer quadrant of right breast in female, estrogen receptor positive (H)       Family History:  Brother w/ melanoma.    Social History:  Tobacco: No.  Etoh: No.  Drugs: No.    Medications / Allergies:  Reviewed in EMR.    --------------------    Physical Exam:  VS: BP (!) 128/1   Pulse 68   Ht 1.6 m (5' 3\")   Wt 73.7 kg (162 lb 8 oz)   BMI 28.79 kg/m    GEN: Well appearing.    Labs / Imaging / Path:  Reviewed breast bx path report  Reviewed screening and diagnostic and contrast mammogram and breast ultrasound.  "

## 2024-08-02 NOTE — TELEPHONE ENCOUNTER
Namita called back to schedule surgery with Dr. Boles.  Namita requested to speak with Magalie.  She would like to be called back preferably today at [phone].      Trudi Gregory on 8/2/2024 at 1:45 PM

## 2024-08-02 NOTE — TELEPHONE ENCOUNTER
Called and LVM for patient to schedule surgery with Dr. Boles. Provided direct call back number [phone].      Gemini Don on 8/2/2024 at 10:42 AM

## 2024-08-02 NOTE — TELEPHONE ENCOUNTER
Called patient and LVM to return surgery scheduling call at phone number at [phone] to schedule with Dr. Boles.    Gemini Don on 8/2/2024 at 4:08 PM

## 2024-08-05 NOTE — TELEPHONE ENCOUNTER
Called patient to schedule surgery with Dr. Boles    Spoke with:  Patient    Date of Surgery: 8/27/24    Arrival time Discussed with Patient:  No    Location of surgery: [facility]     Pre-Op H&P: With PCP Dr. Turk within 30 days of the surgery date    Post-Op Appts: Unable to schedule at this time - see notes     Imaging:  No - N/A  Scheduled: N/A     Discussed with patient pre-op RN will call 2-3 days prior to surgery with arrival time and instructions:  Yes     Packet sent out: No  via Received in clinic by RN  [DATE] 7/31/24    Surgical Soap: Receiving via Mail - Standard      Informed patient that they will need an adult  to bring patient home from surgery: Yes  : Family member       Additional Comments:  Patient was told she could have surgery on 8/28-8/30. Explained to patient that the provider requested another patient be scheduled on 8/28 so it was no longer available. Explained 8/29 is a clinic date and provider does not operate on Fridays. Offered 8/27 to patient but patient was attempting to fly back home that date from moving her daughter into college. Offered 9/4 but patient was under the impression that she could not be done in September. Patient was frustrated that 8/28 was not an option for her, and requested to be scheduled 8/27 but see if 9/4 is an option. Will need to wait to schedule NM, tag, and post-op until we know if patients surgery date will change. Message sent to provider.       All patients questions were answered and patient was instructed to review surgical packet and call back with any questions or concerns.       Gemini Don on 8/5/2024 at 9:55 AM

## 2024-08-06 NOTE — TELEPHONE ENCOUNTER
Patient called in and asked to reschedule her surgery to 9/4/24 instead of 8/27/24 just in case something happens with her flight.     Surgery rescheduled to 9/4/24 in [place].    Message sent to breast center to reschedule NM injection to be completed in [place].    Post-op rescheduled to 9/18/24 in [place] at 4:30 PM.    No further questions at this time.    Gemini Don on 8/6/2024 at 12:25 PM

## 2024-08-06 NOTE — TELEPHONE ENCOUNTER
Called patient back to finish scheduling appointments.    Post-op scheduled for 9/11/24 at 4:30 PM in [place].    Breast nurse notified to reach out to patient to help schedule tag placement.     NM injection to be delivered to the ASC OR for surgeon to inject.     Patient plans on flying home on 8/27/24 in the morning and would not be able to come earlier for an injection from the breast center.     Patients  will drive her home.    Patient has no further questions/concerns at this time.    Gemini Don on 8/6/2024 at 9:40 AM

## 2024-08-08 ENCOUNTER — TELEPHONE (OUTPATIENT)
Dept: SURGERY | Facility: CLINIC | Age: 64
End: 2024-08-08
Payer: COMMERCIAL

## 2024-08-08 NOTE — TELEPHONE ENCOUNTER
Pt called to discuss appointments to confirm she understood them all. RN reviewed appts below and surgery date and time. Sent activation code to pt to get signed up for mychart. No further questions at this time per pt.Magalie Suh RN

## 2024-08-09 ENCOUNTER — MYC MEDICAL ADVICE (OUTPATIENT)
Dept: SURGERY | Facility: CLINIC | Age: 64
End: 2024-08-09
Payer: COMMERCIAL

## 2024-08-16 NOTE — CONFIDENTIAL NOTE
Called pt to review mychart message below. Pt states she had her last Abatacept infusion on 8/5/24 and has entered all her appts in her calendar. No further questions at this time..Magalie Suh RN

## 2024-08-19 ENCOUNTER — ANCILLARY PROCEDURE (OUTPATIENT)
Dept: ULTRASOUND IMAGING | Facility: CLINIC | Age: 64
End: 2024-08-19
Attending: SURGERY
Payer: COMMERCIAL

## 2024-08-19 ENCOUNTER — ANCILLARY PROCEDURE (OUTPATIENT)
Dept: MAMMOGRAPHY | Facility: CLINIC | Age: 64
End: 2024-08-19
Attending: SURGERY
Payer: COMMERCIAL

## 2024-08-19 DIAGNOSIS — Z17.0 MALIGNANT NEOPLASM OF UPPER-OUTER QUADRANT OF RIGHT BREAST IN FEMALE, ESTROGEN RECEPTOR POSITIVE (H): ICD-10-CM

## 2024-08-19 DIAGNOSIS — C50.411 MALIGNANT NEOPLASM OF UPPER-OUTER QUADRANT OF RIGHT BREAST IN FEMALE, ESTROGEN RECEPTOR POSITIVE (H): ICD-10-CM

## 2024-08-19 PROCEDURE — 19285 PERQ DEV BREAST 1ST US IMAG: CPT | Mod: RT | Performed by: RADIOLOGY

## 2024-09-02 ENCOUNTER — ANESTHESIA EVENT (OUTPATIENT)
Dept: SURGERY | Facility: AMBULATORY SURGERY CENTER | Age: 64
End: 2024-09-02
Payer: COMMERCIAL

## 2024-09-02 NOTE — ANESTHESIA PREPROCEDURE EVALUATION
"Anesthesia Pre-Procedure Evaluation    Patient: Namita Altman   MRN: [id number] : 1960        Procedure : Procedure(s):  RIGHT Radiofrequency Identification Seed-Localized Segmental Mastectomy (=\"Lumpectomy\"), RIGHT Axillary [place] Lymph Node Biopsy          No past medical history on file.   History reviewed. No pertinent surgical history.   Allergies   Allergen Reactions    Penicillins       Social History     Tobacco Use    Smoking status: Not on file    Smokeless tobacco: Not on file   Substance Use Topics    Alcohol use: Not on file      Wt Readings from Last 1 Encounters:   24 73.7 kg (162 lb 8 oz)              OUTSIDE LABS:  CBC: No results found for: \"WBC\", \"HGB\", \"HCT\", \"PLT\"  BMP: No results found for: \"NA\", \"POTASSIUM\", \"CHLORIDE\", \"CO2\", \"BUN\", \"CR\", \"GLC\"  COAGS: No results found for: \"PTT\", \"INR\", \"FIBR\"  POC: No results found for: \"BGM\", \"HCG\", \"HCGS\"  HEPATIC: No results found for: \"ALBUMIN\", \"PROTTOTAL\", \"ALT\", \"AST\", \"GGT\", \"ALKPHOS\", \"BILITOTAL\", \"BILIDIRECT\", \"TERRI\"  OTHER: No results found for: \"PH\", \"LACT\", \"A1C\", \"KEREN\", \"PHOS\", \"MAG\", \"LIPASE\", \"AMYLASE\", \"TSH\", \"T4\", \"T3\", \"CRP\", \"SED\"    Anesthesia Plan    ASA Status:  2       Anesthesia Type: General.     - Airway: LMA   Induction: Intravenous, Propofol.   Maintenance: Balanced.        Consents    Anesthesia Plan(s) and associated risks, benefits, and realistic alternatives discussed. Questions answered and patient/representative(s) expressed understanding.     - Discussed: Risks, Benefits and Alternatives for BOTH SEDATION and the PROCEDURE were discussed     - Discussed with:       - Extended Intubation/Ventilatory Support Discussed: No.      - Patient is DNR/DNI Status: No     Use of blood products discussed: No .     Postoperative Care    Pain management: IV analgesics, Oral pain medications, Multi-modal analgesia, Peripheral nerve block (Single Shot).   PONV prophylaxis: Dexamethasone or Solumedrol, Ondansetron (or " other 5HT-3), Background Propofol Infusion     Comments:               Homer Laurent MD    I have reviewed the pertinent notes and labs in the chart from the past 30 days and (re)examined the patient.  Any updates or changes from those notes are reflected in this note.

## 2024-09-03 ENCOUNTER — TELEPHONE (OUTPATIENT)
Dept: SURGERY | Facility: CLINIC | Age: 64
End: 2024-09-03
Payer: COMMERCIAL

## 2024-09-03 NOTE — TELEPHONE ENCOUNTER
Patient called and left message on department VM today at 11:55am. She states she is having a lumpectomy tomorrow 9/4/24 with Dr. Boles and she is getting conflicting arrival times. The nurse told her 7:00am but the text stated 8:00am    Would like a call back to confirm.    Marli Hernandez LPN    
Spoke with pt who states someone reached out to her from the surgery center so she is good to go and has no further questions..Magalie Suh RN    
MAR

## 2024-09-03 NOTE — TELEPHONE ENCOUNTER
Forms completed and faxed to the number provided on the FMLA forms. Checked Right fax and fax was sent sucessfully..Magalie Suh RN

## 2024-09-03 NOTE — TELEPHONE ENCOUNTER
FMLA received via fax. Right breast lumpectomy DOS 9/4/24. Forms in surgery bin.    Marli Hernandez LPN

## 2024-09-04 ENCOUNTER — ANESTHESIA (OUTPATIENT)
Dept: SURGERY | Facility: AMBULATORY SURGERY CENTER | Age: 64
End: 2024-09-04
Payer: COMMERCIAL

## 2024-09-04 ENCOUNTER — ANCILLARY PROCEDURE (OUTPATIENT)
Dept: MAMMOGRAPHY | Facility: CLINIC | Age: 64
End: 2024-09-04
Attending: SURGERY
Payer: COMMERCIAL

## 2024-09-04 ENCOUNTER — HOSPITAL ENCOUNTER (OUTPATIENT)
Facility: AMBULATORY SURGERY CENTER | Age: 64
Discharge: HOME | End: 2024-09-04
Attending: SURGERY | Admitting: SURGERY
Payer: COMMERCIAL

## 2024-09-04 ENCOUNTER — ANCILLARY PROCEDURE (OUTPATIENT)
Dept: NUCLEAR MEDICINE | Facility: CLINIC | Age: 64
End: 2024-09-04
Attending: SURGERY
Payer: COMMERCIAL

## 2024-09-04 VITALS
DIASTOLIC BLOOD PRESSURE: 77 MMHG | RESPIRATION RATE: 10 BRPM | HEART RATE: 83 BPM | BODY MASS INDEX: 28.79 KG/M2 | TEMPERATURE: 97 F | WEIGHT: 162.5 LBS | OXYGEN SATURATION: 95 % | SYSTOLIC BLOOD PRESSURE: 101 MMHG

## 2024-09-04 DIAGNOSIS — C50.411 MALIGNANT NEOPLASM OF UPPER-OUTER QUADRANT OF RIGHT BREAST IN FEMALE, ESTROGEN RECEPTOR POSITIVE (H): ICD-10-CM

## 2024-09-04 DIAGNOSIS — Z17.0 MALIGNANT NEOPLASM OF UPPER-OUTER QUADRANT OF RIGHT BREAST IN FEMALE, ESTROGEN RECEPTOR POSITIVE (H): ICD-10-CM

## 2024-09-04 PROCEDURE — G8907 PT DOC NO EVENTS ON DISCHARG: HCPCS

## 2024-09-04 PROCEDURE — 38792 RA TRACER ID OF SENTINL NODE: CPT | Performed by: RADIOLOGY

## 2024-09-04 PROCEDURE — A9520 TC99 TILMANOCEPT DIAG 0.5MCI: HCPCS | Performed by: RADIOLOGY

## 2024-09-04 PROCEDURE — 19301 PARTIAL MASTECTOMY: CPT | Mod: RT | Performed by: SURGERY

## 2024-09-04 PROCEDURE — 19301 PARTIAL MASTECTOMY: CPT | Performed by: STUDENT IN AN ORGANIZED HEALTH CARE EDUCATION/TRAINING PROGRAM

## 2024-09-04 PROCEDURE — 88307 TISSUE EXAM BY PATHOLOGIST: CPT | Performed by: PATHOLOGY

## 2024-09-04 PROCEDURE — 38525 BIOPSY/REMOVAL LYMPH NODES: CPT | Mod: RT | Performed by: SURGERY

## 2024-09-04 PROCEDURE — 38525 BIOPSY/REMOVAL LYMPH NODES: CPT

## 2024-09-04 PROCEDURE — 76098 X-RAY EXAM SURGICAL SPECIMEN: CPT

## 2024-09-04 PROCEDURE — 88342 IMHCHEM/IMCYTCHM 1ST ANTB: CPT | Performed by: PATHOLOGY

## 2024-09-04 PROCEDURE — 19301 PARTIAL MASTECTOMY: CPT | Mod: RT

## 2024-09-04 PROCEDURE — 38900 IO MAP OF SENT LYMPH NODE: CPT | Mod: RT | Performed by: SURGERY

## 2024-09-04 PROCEDURE — 19301 PARTIAL MASTECTOMY: CPT | Performed by: NURSE ANESTHETIST, CERTIFIED REGISTERED

## 2024-09-04 PROCEDURE — G8916 PT W IV AB GIVEN ON TIME: HCPCS

## 2024-09-04 RX ORDER — METOPROLOL TARTRATE 1 MG/ML
1-2 INJECTION, SOLUTION INTRAVENOUS EVERY 5 MIN PRN
Status: DISCONTINUED | OUTPATIENT
Start: 2024-09-04 | End: 2024-09-05 | Stop reason: HOSPADM

## 2024-09-04 RX ORDER — FENTANYL CITRATE 50 UG/ML
25 INJECTION, SOLUTION INTRAMUSCULAR; INTRAVENOUS
Status: DISCONTINUED | OUTPATIENT
Start: 2024-09-04 | End: 2024-09-05 | Stop reason: HOSPADM

## 2024-09-04 RX ORDER — NALOXONE HYDROCHLORIDE 0.4 MG/ML
0.1 INJECTION, SOLUTION INTRAMUSCULAR; INTRAVENOUS; SUBCUTANEOUS
Status: DISCONTINUED | OUTPATIENT
Start: 2024-09-04 | End: 2024-09-05 | Stop reason: HOSPADM

## 2024-09-04 RX ORDER — SODIUM CHLORIDE, SODIUM LACTATE, POTASSIUM CHLORIDE, CALCIUM CHLORIDE 600; 310; 30; 20 MG/100ML; MG/100ML; MG/100ML; MG/100ML
INJECTION, SOLUTION INTRAVENOUS CONTINUOUS
Status: DISCONTINUED | OUTPATIENT
Start: 2024-09-04 | End: 2024-09-05 | Stop reason: HOSPADM

## 2024-09-04 RX ORDER — NALOXONE HYDROCHLORIDE 0.4 MG/ML
0.4 INJECTION, SOLUTION INTRAMUSCULAR; INTRAVENOUS; SUBCUTANEOUS
Status: DISCONTINUED | OUTPATIENT
Start: 2024-09-04 | End: 2024-09-05 | Stop reason: HOSPADM

## 2024-09-04 RX ORDER — OXYCODONE HYDROCHLORIDE 5 MG/1
5 TABLET ORAL EVERY 4 HOURS PRN
Status: DISCONTINUED | OUTPATIENT
Start: 2024-09-04 | End: 2024-09-05 | Stop reason: HOSPADM

## 2024-09-04 RX ORDER — NALOXONE HYDROCHLORIDE 0.4 MG/ML
0.2 INJECTION, SOLUTION INTRAMUSCULAR; INTRAVENOUS; SUBCUTANEOUS
Status: DISCONTINUED | OUTPATIENT
Start: 2024-09-04 | End: 2024-09-05 | Stop reason: HOSPADM

## 2024-09-04 RX ORDER — CEFAZOLIN SODIUM 2 G/50ML
2 SOLUTION INTRAVENOUS
Status: COMPLETED | OUTPATIENT
Start: 2024-09-04 | End: 2024-09-04

## 2024-09-04 RX ORDER — ACETAMINOPHEN 325 MG/1
975 TABLET ORAL ONCE
Status: COMPLETED | OUTPATIENT
Start: 2024-09-04 | End: 2024-09-04

## 2024-09-04 RX ORDER — OXYCODONE HYDROCHLORIDE 5 MG/1
10 TABLET ORAL EVERY 4 HOURS PRN
Status: DISCONTINUED | OUTPATIENT
Start: 2024-09-04 | End: 2024-09-05 | Stop reason: HOSPADM

## 2024-09-04 RX ORDER — ONDANSETRON 2 MG/ML
4 INJECTION INTRAMUSCULAR; INTRAVENOUS EVERY 30 MIN PRN
Status: DISCONTINUED | OUTPATIENT
Start: 2024-09-04 | End: 2024-09-05 | Stop reason: HOSPADM

## 2024-09-04 RX ORDER — HYDRALAZINE HYDROCHLORIDE 20 MG/ML
2.5-5 INJECTION INTRAMUSCULAR; INTRAVENOUS EVERY 10 MIN PRN
Status: DISCONTINUED | OUTPATIENT
Start: 2024-09-04 | End: 2024-09-05 | Stop reason: HOSPADM

## 2024-09-04 RX ORDER — FLUMAZENIL 0.1 MG/ML
0.2 INJECTION, SOLUTION INTRAVENOUS
Status: DISCONTINUED | OUTPATIENT
Start: 2024-09-04 | End: 2024-09-05 | Stop reason: HOSPADM

## 2024-09-04 RX ORDER — ONDANSETRON 2 MG/ML
INJECTION INTRAMUSCULAR; INTRAVENOUS PRN
Status: DISCONTINUED | OUTPATIENT
Start: 2024-09-04 | End: 2024-09-04

## 2024-09-04 RX ORDER — GLYCOPYRROLATE 0.2 MG/ML
INJECTION, SOLUTION INTRAMUSCULAR; INTRAVENOUS PRN
Status: DISCONTINUED | OUTPATIENT
Start: 2024-09-04 | End: 2024-09-04

## 2024-09-04 RX ORDER — ONDANSETRON 4 MG/1
4 TABLET, ORALLY DISINTEGRATING ORAL EVERY 30 MIN PRN
Status: DISCONTINUED | OUTPATIENT
Start: 2024-09-04 | End: 2024-09-05 | Stop reason: HOSPADM

## 2024-09-04 RX ORDER — ISOSULFAN BLUE 50 MG/5ML
INJECTION, SOLUTION SUBCUTANEOUS PRN
Status: DISCONTINUED | OUTPATIENT
Start: 2024-09-04 | End: 2024-09-04 | Stop reason: HOSPADM

## 2024-09-04 RX ORDER — DEXAMETHASONE SODIUM PHOSPHATE 4 MG/ML
INJECTION, SOLUTION INTRA-ARTICULAR; INTRALESIONAL; INTRAMUSCULAR; INTRAVENOUS; SOFT TISSUE PRN
Status: DISCONTINUED | OUTPATIENT
Start: 2024-09-04 | End: 2024-09-04

## 2024-09-04 RX ORDER — LIDOCAINE 40 MG/G
CREAM TOPICAL
Status: DISCONTINUED | OUTPATIENT
Start: 2024-09-04 | End: 2024-09-05 | Stop reason: HOSPADM

## 2024-09-04 RX ORDER — DEXAMETHASONE SODIUM PHOSPHATE 4 MG/ML
4 INJECTION, SOLUTION INTRA-ARTICULAR; INTRALESIONAL; INTRAMUSCULAR; INTRAVENOUS; SOFT TISSUE
Status: DISCONTINUED | OUTPATIENT
Start: 2024-09-04 | End: 2024-09-05 | Stop reason: HOSPADM

## 2024-09-04 RX ORDER — FENTANYL CITRATE 50 UG/ML
50 INJECTION, SOLUTION INTRAMUSCULAR; INTRAVENOUS EVERY 5 MIN PRN
Status: DISCONTINUED | OUTPATIENT
Start: 2024-09-04 | End: 2024-09-05 | Stop reason: HOSPADM

## 2024-09-04 RX ORDER — FENTANYL CITRATE 50 UG/ML
25 INJECTION, SOLUTION INTRAMUSCULAR; INTRAVENOUS EVERY 5 MIN PRN
Status: DISCONTINUED | OUTPATIENT
Start: 2024-09-04 | End: 2024-09-05 | Stop reason: HOSPADM

## 2024-09-04 RX ORDER — LIDOCAINE HYDROCHLORIDE 20 MG/ML
INJECTION, SOLUTION INFILTRATION; PERINEURAL PRN
Status: DISCONTINUED | OUTPATIENT
Start: 2024-09-04 | End: 2024-09-04

## 2024-09-04 RX ORDER — BUPIVACAINE HYDROCHLORIDE 2.5 MG/ML
INJECTION, SOLUTION EPIDURAL; INFILTRATION; INTRACAUDAL
Status: DISCONTINUED | OUTPATIENT
Start: 2024-09-04 | End: 2024-09-04

## 2024-09-04 RX ORDER — PROPOFOL 10 MG/ML
INJECTION, EMULSION INTRAVENOUS PRN
Status: DISCONTINUED | OUTPATIENT
Start: 2024-09-04 | End: 2024-09-04

## 2024-09-04 RX ORDER — CEFAZOLIN SODIUM 2 G/50ML
2 SOLUTION INTRAVENOUS SEE ADMIN INSTRUCTIONS
Status: DISCONTINUED | OUTPATIENT
Start: 2024-09-04 | End: 2024-09-05 | Stop reason: HOSPADM

## 2024-09-04 RX ORDER — FENTANYL CITRATE 50 UG/ML
25-50 INJECTION, SOLUTION INTRAMUSCULAR; INTRAVENOUS
Status: DISCONTINUED | OUTPATIENT
Start: 2024-09-04 | End: 2024-09-05 | Stop reason: HOSPADM

## 2024-09-04 RX ADMIN — FENTANYL CITRATE 25 MCG: 50 INJECTION, SOLUTION INTRAMUSCULAR; INTRAVENOUS at 10:30

## 2024-09-04 RX ADMIN — CEFAZOLIN SODIUM 2 G: 2 SOLUTION INTRAVENOUS at 09:36

## 2024-09-04 RX ADMIN — GLYCOPYRROLATE 0.1 MG: 0.2 INJECTION, SOLUTION INTRAMUSCULAR; INTRAVENOUS at 09:59

## 2024-09-04 RX ADMIN — DEXAMETHASONE SODIUM PHOSPHATE 4 MG: 4 INJECTION, SOLUTION INTRA-ARTICULAR; INTRALESIONAL; INTRAMUSCULAR; INTRAVENOUS; SOFT TISSUE at 09:50

## 2024-09-04 RX ADMIN — FENTANYL CITRATE 25 MCG: 50 INJECTION, SOLUTION INTRAMUSCULAR; INTRAVENOUS at 10:59

## 2024-09-04 RX ADMIN — PROPOFOL 180 MG: 10 INJECTION, EMULSION INTRAVENOUS at 09:41

## 2024-09-04 RX ADMIN — FENTANYL CITRATE 50 MCG: 50 INJECTION, SOLUTION INTRAMUSCULAR; INTRAVENOUS at 08:46

## 2024-09-04 RX ADMIN — FENTANYL CITRATE 25 MCG: 50 INJECTION, SOLUTION INTRAMUSCULAR; INTRAVENOUS at 09:50

## 2024-09-04 RX ADMIN — ONDANSETRON 4 MG: 2 INJECTION INTRAMUSCULAR; INTRAVENOUS at 09:50

## 2024-09-04 RX ADMIN — FENTANYL CITRATE 25 MCG: 50 INJECTION, SOLUTION INTRAMUSCULAR; INTRAVENOUS at 10:49

## 2024-09-04 RX ADMIN — LIDOCAINE HYDROCHLORIDE 80 MG: 20 INJECTION, SOLUTION INFILTRATION; PERINEURAL at 09:41

## 2024-09-04 RX ADMIN — GLYCOPYRROLATE 0.1 MG: 0.2 INJECTION, SOLUTION INTRAMUSCULAR; INTRAVENOUS at 09:57

## 2024-09-04 RX ADMIN — SODIUM CHLORIDE, SODIUM LACTATE, POTASSIUM CHLORIDE, CALCIUM CHLORIDE: 600; 310; 30; 20 INJECTION, SOLUTION INTRAVENOUS at 07:49

## 2024-09-04 RX ADMIN — PROPOFOL 50 MCG/KG/MIN: 10 INJECTION, EMULSION INTRAVENOUS at 09:44

## 2024-09-04 RX ADMIN — ACETAMINOPHEN 975 MG: 325 TABLET ORAL at 07:40

## 2024-09-04 RX ADMIN — BUPIVACAINE HYDROCHLORIDE 20 ML: 2.5 INJECTION, SOLUTION EPIDURAL; INFILTRATION; INTRACAUDAL at 08:50

## 2024-09-04 NOTE — OP NOTE
PATIENT NAME:  Namita Altman  PATIENT YOB: 1960    DATE OF SURGERY: September 4, 2024     SURGEON: Magdalena Boles MD  ASSISTANT(S): None    PREOPERATIVE DIAGNOSIS: Breast cancer, right upper outer breast  POSTOPERATIVE DIAGNOSIS: same    PROCEDURE(S):   Right axillary sentinel lymph node mapping and biopsy  Right RFID seed-localized segmental mastectomy     ANESTHESIA: General + Block    CLINICAL NOTE:  Namita Altman is a 64 year old woman with an invasive lobular carcinoma of the right upper outer breast.  The risks and benefits of a right RFID seed-localized segmental mastectomy and axillary mounika staging were discussed with the patient and she elected to proceed with informed consent.    OPERATIVE FINDINGS:  1. One sentinel lymph node removed.  2. Specimen radiograph confirmed presence of the RFID seed, mass and biopsy clip within the specimen.     OPERATIVE PROCEDURE:  The radiofrequency identification (RFID) seed localization images were personally reviewed by me prior to the procedure. The patient first presented to the Nuclear Medicine department for subareolar injection of technetium-labelled Tilmanocept.  The patient was brought to the operating room and placed in the supine position with appropriate padding for all of the pressure points.  A general anesthetic was administered by the Anesthesia team.  A surgical safety checklist was performed to confirm the patient's identity, the site and laterality of the procedure. Perioperative antibiotics (Ancef) was provided.  VTE prophylaxis was provided with serial compression devices. 2.5 mL of lymphazurin was injected into the right  subareolar space and the right  breast was massaged for 5 minutes.  The right  breast and axilla were then prepped and draped in the usual sterile fashion using Chloraprep.     We began with the axilla.  An incision was made just below the hair-bearing area of the right axilla.  The clavipectoral fascia was incised to enter  the axilla. The Neoprobe was used to identify the sentinel lymph nodes.  [place] lymph node #1 was blue and had an ex vivo count of 3020.  The background count was 96. No other blue or palpable lymph nodes were found.  Thus no additional sentinel lymph nodes were sought.  All of the lymph nodes were sent to pathology individually. The wound was irrigated and hemostasis was achieved.  Surgicell snow was placed. The incision was closed in two layers with interrupted 3-0 vicryl and a running subcuticular 4-0 monocryl.     Next, a curvilinear incision was made in the upper outer quadrant of the right breast.  Superficial skin flaps were raised.  The breast tissue denoted by the localizing RFID seed (ID [id number]) was dissected out, with careful attention paid to resect this area with a grossly normal margin of surrounding breast tissue.  The dissection was carried out down to the pectoralis major muscle, taking its fascia en bloc with the specimen. The specimen was inked and radiographed.  It was found to contain the mass, RFID seed and biopsy clip.  The specimen was then sent to pathology. The wound was irrigated and hemostasis was achieved. Hemoclips were placed to wagner the edges of the cavity: medial, inferior, lateral, superior, and posterior (deep).  Surgicell snow was placed. Deep 3-0 vicryl sutures were placed to reapproximate the breast tissue to avoid a divot.  The incision was closed in two layers with interrupted 3-0 vicryl and a running subcuticular 4-0 monocryl. Steristrips and Primipore dressings were applied.  The chest was wrapped in an ACE bandage.  The patient tolerated the procedure well. The sponge, needle, instrument counts were correct.  The patient was then awakened and taken to recovery in stable fashion.     I was present and scrubbed for the entire above procedure.    EBL: 5 mL    SPECIMEN(S):  A. Right axillary sentinel lymph node # 1  B. Right breast mass    POSTOPERATIVE PLANS:  Namita Altman  will be discharged home today with wound care instructions and no prescription for analgesics.  She will follow-up with me in 2 weeks.     COMMISSION ON CANCER SYNOPTIC REPORT:  [place] Node Biopsy for Breast Cancer - Right  Operation performed with curative intent Yes   Tracer(s) used to identify sentinel nodes in the upfront surgery (non-neoadjuvant) setting Dye and Radioactive tracer   Tracer(s) used to identify sentinel nodes in the neoadjuvant setting N/A   All nodes (colored or non-colored) present at the end of a dye-filled lymphatic channel were removed Yes   All significantly radioactive nodes were removed Yes   All palpably suspicious nodes were removed N/A   Biopsy-proven positive nodes marked with clips prior to chemotherapy were identified and removed N/A     Magdalena Boles MD MSc [facility] FACS  Associate Professor of Surgery  Division of Surgical Oncology  [facility]

## 2024-09-04 NOTE — ANESTHESIA CARE TRANSFER NOTE
"  Patient: Namita Altman    Procedure: Procedure(s):  RIGHT Radiofrequency Identification Seed-Localized Segmental Mastectomy (=\"Lumpectomy\"), RIGHT Axillary [place] Lymph Node Biopsy       Diagnosis: Malignant neoplasm of upper-outer quadrant of right breast in female, estrogen receptor positive (H) [C50.411, Z17.0]  Diagnosis Additional Information: No value filed.    Anesthesia Type:   General     Note:    Oropharynx: oropharynx clear of all foreign objects  Level of Consciousness: drowsy  Oxygen Supplementation: face mask  Level of Supplemental Oxygen (L/min / FiO2): 8  Independent Airway: airway patency satisfactory and stable  Dentition: dentition unchanged  Vital Signs Stable: post-procedure vital signs reviewed and stable  Report to RN Given: handoff report given  Patient transferred to: PACU    Handoff Report: Identifed the Patient, Identified the Reponsible Provider, Reviewed the pertinent medical history, Discussed the surgical course, Reviewed Intra-OP anesthesia mangement and issues during anesthesia, Set expectations for post-procedure period and Allowed opportunity for questions and acknowledgement of understanding  Vitals:  Vitals Value Taken Time   BP     Temp     Pulse     Resp     SpO2 100 % 09/04/24 1101   Vitals shown include unfiled device data.    Electronically Signed By: DALILA Linder CRNA  September 4, 2024  11:02 AM  "

## 2024-09-04 NOTE — DISCHARGE INSTRUCTIONS
Tylenol 975 mg was given at 745 am.    You should not take more then 4,000 mg of tylenol/acetaminophen in a 24 hour period.     After Care Instructions  Diet Instructions  Follow your surgeon's orders for any diet restrictions.  If you did not receive any diet restrictions, you may drink clear liquids (apple juice, ginger ale, 7-up, broth, etc.), and progress to your regular diet as you feel able. It is important to stay well-hydrated after surgery and drink plenty of water.  Discharge Instructions - Comfort and Pain Management  Pain after surgery is normal and expected. You will have some amount of pain after surgery. Your pain will improve with time. There are several things you can do to help reduce your pain including: rest, ice, and using pain medications as needed. Use pain interventions and don't wait until pain level is out of control. Contact your Surgeon Team if you have pain that persists or worsens after surgery despite rest, ice, and taking your medication(s) as prescribed. You may have a dry mouth, a sore throat, muscles aches or trouble sleeping, and these symptoms should go away after 24 hours.  Discharge Instructions - Rest  Rest and relax for the next 24 hours. Make arrangements to have someone stay with you overnight, and avoid hazardous and strenuous activities.  Do NOT make any important decisions for the next 24 hours.  Incision Care  POSTOP Instructions: Lumpectomy    From Dr Boles:  1. Patient to follow up with appointment in 2 weeks. Your pathology report will be reviewed with you at the postoperative visit.  2. Do not drive while taking narcotic pain medication (oxycodone) IF prescribed.  3. Take Tylenol 1000 mg by mouth every 8 hours for 3 days. After that, may take Tylenol as needed according to the packaging.  4. Avoid non-steroidal anti-inflammatory medications (Advil, Ibuprofen, Naproxen, aspirin, etc) for 5-7 days.  5. Caution with putting ice on the incision as it will be numb you will  "not realize it if you leave the ice for too long and can damage your skin.  6. If you develop any fever/chills, worsening pain, redness, swelling, bruising, or drainage from your wound please call the clinic  Monday through Friday 8:00am-5:00pm:      - [facility] or [facility] on [facility]: [phone] Sangeeta RIOS      [facility]: [phone] Magalie RIOS  Nights and Weekends:      - The on-call surgical oncology resident. Call [phone] and ask \"I would like to page the Surgical Oncology Resident on call.\"    7. No lifting over 15 lbs and no strenuous physical activity for 2 weeks.    Please continue to gently stretch your arm/shoulders and reach overhead.  No bed rest; moving around will keep blood clots from forming in your legs.  8. Keep dressing clean and dry. Okay to shower in 2 days. May remove outer large dressing in 2 days prior to shower.  The steristrips will fall off on their own in 10-14 days. Your sutures are dissolvable.  Please refrain from submerging in a bathtub or swimming pool.    Please refrain from applying alcohol or peroxide to the incision.  9. Wear a supportive bra \"around the clock\" 24/7 for a minimum of 2 weeks.  10. No ice packs or heat packs on the surgical site(s).  No Alcohol  Do NOT drink alcoholic beverages for 24 hours following your surgery and while taking pain medications.  No driving or operating machinery  Do NOT drive any vehicle or operate mechanical equipment for 24 hours following the end of your surgery.  Even though you may feel normal, your reactions may be affected by Anesthesia medication you received.  Symptoms - Fever Management  A low grade fever can be expected after surgery. Your Provider many have prescribed an Opioid pain medication that also contains acetaminophen (TYLENOL) that may help with Fever management.  Do NOT take additional acetaminophen (TYLENOL) in combination with an Opioid/acetaminophen (TYLENOL) product. " Read the labels on your Over The Counter (OTC) medications with care.  Symptoms - Reduced Urine Output  If it has been greater than 8 hours since you have urinated despite drinking plenty of water, call your Surgeon Team.  When to call - Contact Surgeon Team  You may experience symptoms that require follow-up before your scheduled appointment. Contact your Surgeon Team if you are concerned about pain control, large amount of bleeding, blood clots, constipation, or if you experience signs of infection (fever, growing tenderness at the surgery site, a large amount of drainage, severe pain, foul-smelling drainage, redness or swelling.  When to call - Reach out to Urgent Care  If you are experiencing uncontrolled Nausea and Vomiting, uncontrolled pain, inability to urinate and uncomfortable, and in need of immediate care, and you are NOT able to reach your Surgeon Team, go to an Urgent Care clinic. Do NOT go to the Emergency Room unless you have shortness of breath, chest pain, or other signs of a medical emergency.  When to call - Reasons to Call 911  Call 911 immediately if you experience sudden-onset chest pain, arm weakness/numbness, slurred speech, or shortness of breath

## 2024-09-04 NOTE — ANESTHESIA PROCEDURE NOTES
Pectoralis Procedure Note    Pre-Procedure   Staff -        Anesthesiologist:  Homer Laurent MD       Performed By: anesthesiologist       Location: pre-op       Procedure Start/Stop Times: 9/4/2024 8:50 AM and 9/4/2024 8:55 AM       Pre-Anesthestic Checklist: patient identified, IV checked, site marked, risks and benefits discussed, informed consent, monitors and equipment checked, pre-op evaluation, at physician/surgeon's request and post-op pain management  Timeout:       Correct Patient: Yes        Correct Procedure: Yes        Correct Site: Yes        Correct Position: Yes        Correct Laterality: Yes        Site Marked: Yes  Procedure Documentation  Procedure: Pectoralis             Pectoralis I and Pectoralis II       Diagnosis: BREAST CANCER       Laterality: right       Patient Position: sitting       Patient Prep/Sterile Barriers: sterile gloves, mask       Skin prep: Chloraprep       Needle Gauge: 21.        Needle Length (millimeters): 110        Ultrasound guided       1. Ultrasound was used to identify targeted nerve, plexus, vascular marker, or fascial plane and place a needle adjacent to it in real-time.       2. Ultrasound was used to visualize the spread of anesthetic in close proximity to the above referenced structure.       3. A permanent image is entered into the patient's record.       4. The visualized anatomic structures appeared normal.       5. There were no apparent abnormal pathologic findings.    Assessment/Narrative         The placement was negative for: blood aspirated, painful injection and site bleeding       Paresthesias: No.       Bolus given via needle..        Secured via.        Insertion/Infusion Method: Single Shot       Complications: none       Injection made incrementally with aspirations every 5 mL.    Medication(s) Administered   Bupivacaine 0.25% PF (Infiltration) - Infiltration   20 mL - 9/4/2024 8:50:00 AM  Bupivacaine liposome (Exparel) 1.3% LA inj susp  "(Infiltration) - Infiltration   20 mL - 9/4/2024 8:50:00 AM  Medication Administration Time: 9/4/2024 8:50 AM      FOR [facility] ONLY:   Pain Team Contact information: please page the Pain Team Via [employer]. Search \"Pain\". During daytime hours, please page the attending first. At night please page the resident first.      "

## 2024-09-04 NOTE — ANESTHESIA PROCEDURE NOTES
Airway       Patient location during procedure: OR  Staff -        CRNA: Annette Simmons APRN CRNA       Performed By: CRNA  Consent for Airway        Urgency: elective  Indications and Patient Condition       Indications for airway management: derek-procedural       Induction type:intravenous       Mask difficulty assessment: 0 - not attempted    Final Airway Details       Final airway type: supraglottic airway    Supraglottic Airway Details        Type: LMA       Brand: I-Gel       LMA size: 4    Post intubation assessment        Placement verified by: capnometry and equal breath sounds        Number of attempts at approach: 1       Number of other approaches attempted: 0       Secured with: tape       Ease of procedure: easy

## 2024-09-04 NOTE — ANESTHESIA POSTPROCEDURE EVALUATION
"Patient: Namita Altman    Procedure: Procedure(s):  RIGHT Radiofrequency Identification Seed-Localized Segmental Mastectomy (=\"Lumpectomy\"), RIGHT Axillary [place] Lymph Node Biopsy       Anesthesia Type:  General    Note:  Disposition: Outpatient   Postop Pain Control: Uneventful            Sign Out: Well controlled pain   PONV: No   Neuro/Psych: Uneventful            Sign Out: Acceptable/Baseline neuro status   Airway/Respiratory: Uneventful            Sign Out: Acceptable/Baseline resp. status   CV/Hemodynamics: Uneventful            Sign Out: Acceptable CV status; No obvious hypovolemia; No obvious fluid overload   Other NRE:    DID A NON-ROUTINE EVENT OCCUR?            Last vitals:  Vitals Value Taken Time   /72 09/04/24 1115   Temp 97.2  F (36.2  C) 09/04/24 1100   Pulse 79 09/04/24 1115   Resp 18 09/04/24 1115   SpO2 93 % 09/04/24 1115       Electronically Signed By: Homer Laurent MD  September 4, 2024  1:17 PM  "

## 2024-09-09 ENCOUNTER — TELEPHONE (OUTPATIENT)
Dept: UROLOGY | Facility: CLINIC | Age: 64
End: 2024-09-09
Payer: COMMERCIAL

## 2024-09-09 NOTE — TELEPHONE ENCOUNTER
Patient calling with additional questions related to her lumpectomy test results and upcoming appointments with Oncology and Breast Surgery follow up.    Patient inquiring about pathology results and when she can anticipate hearing of these results.     She is also inquiring if she is able to schedule a follow up with both oncology and surgery on the same day.    Will communicate to team with inquiry on the above information.      Meghan Nj RN on 9/9/2024 at 3:31 PM

## 2024-09-10 NOTE — TELEPHONE ENCOUNTER
Contacted patient, informed that her follow up appointment is scheduled too soon for Medical Oncology.  Patient informed that her surgical pathology is not yet resulted and the Oncotype test cannot be sent until the pathology is finalized.  The Oncotype test will take approximately 2 weeks to result.  Assisted in rescheduling Medical Oncology follow up to Wednesday 9/25 at 2:45 PM and advised to follow up as scheduled with Dr. Boles for post-surgery follow up.  Patient verbalizes understanding and agrees to plan.       Evelina Gonsalez RN

## 2024-09-10 NOTE — TELEPHONE ENCOUNTER
Called pt regarding question below about pathology results and when those can be reviewed.Pt did not answer, voice message Id's patient. Left detailed message with reason for call and explaining that  will review in detail the pathology results at her post operative visit on 9/18/24. This allows for the results to be completed and also to have a face to face visit with patient to see how she is doing post surgery. Pt advised to call RN back with any further questions to [phone]..Magalie Suh RN

## 2024-09-18 ENCOUNTER — PATIENT OUTREACH (OUTPATIENT)
Dept: ONCOLOGY | Facility: CLINIC | Age: 64
End: 2024-09-18

## 2024-09-18 ENCOUNTER — TELEPHONE (OUTPATIENT)
Dept: SURGERY | Facility: CLINIC | Age: 64
End: 2024-09-18

## 2024-09-18 ENCOUNTER — OFFICE VISIT (OUTPATIENT)
Dept: SURGERY | Facility: CLINIC | Age: 64
End: 2024-09-18
Payer: COMMERCIAL

## 2024-09-18 DIAGNOSIS — C50.411 MALIGNANT NEOPLASM OF UPPER-OUTER QUADRANT OF RIGHT BREAST IN FEMALE, ESTROGEN RECEPTOR POSITIVE (H): Primary | ICD-10-CM

## 2024-09-18 DIAGNOSIS — Z17.0 MALIGNANT NEOPLASM OF UPPER-OUTER QUADRANT OF RIGHT BREAST IN FEMALE, ESTROGEN RECEPTOR POSITIVE (H): Primary | ICD-10-CM

## 2024-09-18 LAB
PATH REPORT.COMMENTS IMP SPEC: ABNORMAL
PATH REPORT.COMMENTS IMP SPEC: ABNORMAL
PATH REPORT.COMMENTS IMP SPEC: YES
PATH REPORT.FINAL DX SPEC: ABNORMAL
PATH REPORT.GROSS SPEC: ABNORMAL
PATH REPORT.MICROSCOPIC SPEC OTHER STN: ABNORMAL
PATH REPORT.RELEVANT HX SPEC: ABNORMAL
PATHOLOGY SYNOPTIC REPORT: ABNORMAL
PHOTO IMAGE: ABNORMAL

## 2024-09-18 PROCEDURE — 99024 POSTOP FOLLOW-UP VISIT: CPT | Performed by: SURGERY

## 2024-09-18 NOTE — PROGRESS NOTES
[facility]:  Cancer Care Note    Order for Oncotype Dx testing was submitted via the [employer] portal today, as requested by Dr. Mcintosh.  Testing was requested for surgical pathology case number [id number], collected on 9/4/2024 (specimen site: Breast, right).  A copy of patient's pathology results report, face sheet, visit notes and copies of insurance cards were uploaded to [employer] [facility] with the submitted order.     [employer] Order Number:  [id number]    Patient will follow-up with Dr. Mcintosh in approximately 2 weeks weeks to review the final results.   Will need to reschedule current follow up in place as will not have Oncotype results available - pathology report was not finalized until 9/17/2024.     Evelina Gonsalez, RN  RN Care Coordinator - Oncology  [facility]

## 2024-09-18 NOTE — NURSING NOTE
Namita Altman's goals for this visit include:   Chief Complaint   Patient presents with    Consult    Surgical Followup     Right breast lumpectomy       She requests these members of her care team be copied on today's visit information: no    PCP: Annetta Turk    Referring Provider:  Referred Self, MD  No address on file    There were no vitals taken for this visit.    Do you need any medication refills at today's visit? No    Marli Hernandez LPN

## 2024-09-18 NOTE — PROGRESS NOTES
[facility]  [address] [address] [place] [id number]  Phone: [phone]    PATIENT NAME:  Namita Altman  PATIENT YOB: 1960    FOLLOW-UP  Sep 18, 2024    Namita Altman is a 64 year old female who returns for her 1st post-operative follow-up visit.     Cancer Staging   Malignant neoplasm of upper-outer quadrant of right breast in female, estrogen receptor positive (H)  Staging form: Breast, AJCC 8th Edition  - Clinical: Stage IA (cT1c, cN0, cM0, G1, ER+, TN-, HER2-) - Unsigned    Treatment to date:  Right RFID seed-localized segmental mastectomy and right axillary sentinel lymph node mapping and biopsy (9/4/2024)    HPI:    She underwent a right RFID seed-localized segmental mastectomy and SLNB on 9/4/2024.  She is currently 2 week(s) post-op.  Final surgical pathology showed a pT1cN0 invasive lobular carcinoma, with uninvolved margins and 0/1 lymph nodes.    Since the procedure, she has been doing well. She denies any redness or swelling, or drainage from the incision, or fever/chills.  She has good range of motion and denies any upper extremity swelling.     Physical Exam  Constitutional:       Appearance: She is well-developed.   Pulmonary:      Effort: No respiratory distress.   Chest:       Lymphadenopathy:      Comments: Axillary incision healing well without hematoma, or cellulitis. There is a 2.5 cm seroma.   Skin:     General: Skin is warm and dry.         INVESTIGATIONS:    Surgical Pathology (9/4/2024):  Final Diagnosis   A. Lymph node, RIGHT axillary, sentinel #1, excision:  -One benign lymph node (0/1)     B. RIGHT breast, upper outer quadrant, RFID tag-localized segmental mastectomy:  -INVASIVE LOBULAR CARCINOMA, JORGE GRADE 2, size 19 mm  -No lymphovascular invasion identified  -Margins are uninvolved by invasive carcinoma  -Invasive carcinoma is 3 mm from the nearest (posterior and inferior) margin(s), 5 mm from the anterior margin(s), and > 5 mm  from the medial and lateral margin(s)  -Lobular carcinoma in situ (LCIS), classic type  -Other findings: fibrocystic change (including microcysts)  -Calcifications associated with LCIS, and with benign ducts and acini  -Prior core biopsy site changes       ASSESSMENT:    Namita Altman is a 64 year old female with RIGHT breast cancer, s/p surgery.    She is healing well.  I recommended she start moisturizing and massaging the scar with Vaseline.  She may return to regular activities without lifting restrictions.    We reviewed the pathology today and a copy of the report was provided. No further surgery is indicated.  We reviewed the role of Oncotype DX in decision-making for adjuvant chemotherapy; this is pending.  She will follow up with Dr Mcintosh next week to further discuss.  We reviewed that she will also need adjuvant radiation therapy to complete breast conservation therapy; a referral to Radiation Oncology will be made. I will see her on an as needed basis.     All of the above was discussed with the patient and all questions were answered.     PLAN:  Follow up with Dr Mcintosh next week  Radiation oncology referral  Follow up with me RAVIN Boles MD MS [facility] FACS  Associate Professor of Surgery  Division of Surgical Oncology  [facility]

## 2024-09-18 NOTE — TELEPHONE ENCOUNTER
Form completed and emailed to [email or web address] and [email or web address]    Marli Hernandez LPN

## 2024-09-18 NOTE — TELEPHONE ENCOUNTER
Patient see in clinic today and dropped of RTW form to be completed. Per Dr. Boles, patient may return to work with no restrictions.    In surgery bin.    Patient requesting form be emailed to address on form as well as her once completed.    Marli Hernandez LPN

## 2024-09-18 NOTE — LETTER
9/18/2024      Namita Altman  [address] [facility] [address]      Dear Colleague,    Thank you for referring your patient, Namita Altman, to the [facility]. Please see a copy of my visit note below.      [facility]  [address] [address] [place] [id number]  Phone: [phone]    PATIENT NAME:  Namita Altman  PATIENT YOB: 1960    FOLLOW-UP  Sep 18, 2024    Namita Altman is a 64 year old female who returns for her 1st post-operative follow-up visit.     Cancer Staging   Malignant neoplasm of upper-outer quadrant of right breast in female, estrogen receptor positive (H)  Staging form: Breast, AJCC 8th Edition  - Clinical: Stage IA (cT1c, cN0, cM0, G1, ER+, ME-, HER2-) - Unsigned    Treatment to date:  Right RFID seed-localized segmental mastectomy and right axillary sentinel lymph node mapping and biopsy (9/4/2024)    HPI:    She underwent a right RFID seed-localized segmental mastectomy and SLNB on 9/4/2024.  She is currently 2 week(s) post-op.  Final surgical pathology showed a pT1cN0 invasive lobular carcinoma, with uninvolved margins and 0/1 lymph nodes.    Since the procedure, she has been doing well. She denies any redness or swelling, or drainage from the incision, or fever/chills.  She has good range of motion and denies any upper extremity swelling.     Physical Exam  Constitutional:       Appearance: She is well-developed.   Pulmonary:      Effort: No respiratory distress.   Chest:       Lymphadenopathy:      Comments: Axillary incision healing well without hematoma, or cellulitis. There is a 2.5 cm seroma.   Skin:     General: Skin is warm and dry.         INVESTIGATIONS:    Surgical Pathology (9/4/2024):  Final Diagnosis   A. Lymph node, RIGHT axillary, sentinel #1, excision:  -One benign lymph node (0/1)     B. RIGHT breast, upper outer quadrant, RFID tag-localized segmental mastectomy:  -INVASIVE LOBULAR CARCINOMA, [place]  GRADE 2, size 19 mm  -No lymphovascular invasion identified  -Margins are uninvolved by invasive carcinoma  -Invasive carcinoma is 3 mm from the nearest (posterior and inferior) margin(s), 5 mm from the anterior margin(s), and > 5 mm from the medial and lateral margin(s)  -Lobular carcinoma in situ (LCIS), classic type  -Other findings: fibrocystic change (including microcysts)  -Calcifications associated with LCIS, and with benign ducts and acini  -Prior core biopsy site changes       ASSESSMENT:    Namita Altman is a 64 year old female with RIGHT breast cancer, s/p surgery.    She is healing well.  I recommended she start moisturizing and massaging the scar with Vaseline.  She may return to regular activities without lifting restrictions.    We reviewed the pathology today and a copy of the report was provided. No further surgery is indicated.  We reviewed the role of Oncotype DX in decision-making for adjuvant chemotherapy; this is pending.  She will follow up with Dr Mcintosh next week to further discuss.  We reviewed that she will also need adjuvant radiation therapy to complete breast conservation therapy; a referral to Radiation Oncology will be made. I will see her on an as needed basis.     All of the above was discussed with the patient and all questions were answered.     PLAN:  Follow up with Dr Mcintosh next week  Radiation oncology referral  Follow up with me RAVIN Boles MD MS [facility] FACS  Associate Professor of Surgery  Division of Surgical Oncology  [facility]       Again, thank you for allowing me to participate in the care of your patient.        Sincerely,        Magdalena Boles MD

## 2024-09-19 ENCOUNTER — PATIENT OUTREACH (OUTPATIENT)
Dept: ONCOLOGY | Facility: CLINIC | Age: 64
End: 2024-09-19
Payer: COMMERCIAL

## 2024-09-19 NOTE — PROGRESS NOTES
New Patient Oncology Nurse Navigator Note     Referring provider: Magdalena Boles MD      Referring Clinic/Organization: [facility] Surgery     Referred to (specialty:) Radiation Oncology      Date Referral Received: September 19, 2024     Evaluation for:  C50.411, Z17.0 (ICD-10-CM) - Malignant neoplasm of upper-outer quadrant of right breast in female, estrogen receptor positive (H)     Clinical History (per Nurse review of records provided):      Namita Altman is a 64 year old female who had bilateral screening mammograms on 7/3/24 revealing a possible asymmetry in the right breast on the CC view lateral position, posterior depth. Possible correlate in superior breast on MLO view. Right breast diagnostic mammogram and ultrasound followed on 7/16. Additional mammographic images were obtained for further evaluation of possible asymmetry in the right breast on the CC view, lateral position. Additional views confirm an irregular hyperdense  mass with angulated margins at the 11:00 position right breast, posterior depth.     Targeted right breast ultrasound was performed demonstrating an irregular hypoechoic shadowing mass with spiculated margins at the 11:00 position, 10 cm from the nipple measuring 1.3 x 0.9 x 0.7 cm. No suspicious right axillary lymph node.    Contrast-enhanced mammogram preceded biopsy on 7/22 and the spiculated mass in the right breast at 11:00 posterior depth was visualized on low energy views.    7/22/24 Case: [id number]                                   RIGHT breast, 11:00, 10 cm from nipple, ultrasound guided core biopsy:  -INVASIVE LOBULAR CARCINOMA, [place] grade 1  -Calcifications associated with invasive carcinoma  -Invasive carcinoma is estrogen receptor positive, progesterone receptor negative and HER2 negative (score 0) by immunohistochemistry (see biomarker reporting template below)  -See comment  Comment    Invasive carcinoma involves multiple tissue cores, and is 10  mm in greatest dimension in a single core.  The Empire grade is 1 (tubule formation 3 + nuclear pleomorphism 1 + mitotic activity 1 = Empire score 5).  The tumor morphology is typical of invasive lobular carcinoma. An E-cadherin immunostain shows membranous staining in tumor cells, diminished in comparison to benign epithelial cells.    Patient met with Dr. Mcintosh and Dr. Boles in [facility] and proceeded with surgery.    9/4/24 - [id number]  A. Lymph node, RIGHT axillary, sentinel #1, excision:  -One benign lymph node (0/1)  B. RIGHT breast, upper outer quadrant, RFID tag-localized segmental mastectomy:  -INVASIVE LOBULAR CARCINOMA, JOR[place] GRADE 2, size 19 mm  -No lymphovascular invasion identified  -Margins are uninvolved by invasive carcinoma  -Invasive carcinoma is 3 mm from the nearest (posterior and inferior) margin(s), 5 mm from the anterior  margin(s), and > 5 mm from the medial and lateral margin(s)  -Lobular carcinoma in situ (LCIS), classic type  -Other findings: fibrocystic change (including microcysts)  -Calcifications associated with LCIS, and with benign ducts and acini  -Prior core biopsy site changes    Oncotype RS = 16.    Do you have any previous cancer diagnoses? Melanoma RUE 2020. Detection and excision through [facility] and Dr. Mariam Esteves.    Rheumatoid arthritis maintained on biologic therapy.     Records Location: See Bookmarked material     Records Needed: 2020 Melanoma records  - Dermatology consult and progress notes from Dr. Mariam Esteves at [facility], pathology report from original punch/shave biopsy and treatment excision pathology.

## 2024-09-26 ENCOUNTER — TRANSFERRED RECORDS (OUTPATIENT)
Dept: HEALTH INFORMATION MANAGEMENT | Facility: CLINIC | Age: 64
End: 2024-09-26
Payer: COMMERCIAL

## 2024-09-27 LAB — SPECIMEN STATUS: NORMAL

## 2024-10-01 ENCOUNTER — ONCOLOGY VISIT (OUTPATIENT)
Dept: ONCOLOGY | Facility: CLINIC | Age: 64
End: 2024-10-01
Attending: INTERNAL MEDICINE
Payer: COMMERCIAL

## 2024-10-01 VITALS
HEIGHT: 63 IN | BODY MASS INDEX: 28.88 KG/M2 | DIASTOLIC BLOOD PRESSURE: 82 MMHG | WEIGHT: 163 LBS | SYSTOLIC BLOOD PRESSURE: 128 MMHG | OXYGEN SATURATION: 99 % | HEART RATE: 61 BPM

## 2024-10-01 DIAGNOSIS — C50.411 MALIGNANT NEOPLASM OF UPPER-OUTER QUADRANT OF RIGHT BREAST IN FEMALE, ESTROGEN RECEPTOR POSITIVE (H): Primary | ICD-10-CM

## 2024-10-01 DIAGNOSIS — Z17.0 MALIGNANT NEOPLASM OF UPPER-OUTER QUADRANT OF RIGHT BREAST IN FEMALE, ESTROGEN RECEPTOR POSITIVE (H): Primary | ICD-10-CM

## 2024-10-01 DIAGNOSIS — M81.8 OSTEOPOROSIS DUE TO AROMATASE INHIBITOR: ICD-10-CM

## 2024-10-01 DIAGNOSIS — T38.6X5A OSTEOPOROSIS DUE TO AROMATASE INHIBITOR: ICD-10-CM

## 2024-10-01 PROCEDURE — 99214 OFFICE O/P EST MOD 30 MIN: CPT | Performed by: INTERNAL MEDICINE

## 2024-10-01 PROCEDURE — G2211 COMPLEX E/M VISIT ADD ON: HCPCS | Performed by: INTERNAL MEDICINE

## 2024-10-01 PROCEDURE — G0463 HOSPITAL OUTPT CLINIC VISIT: HCPCS | Performed by: INTERNAL MEDICINE

## 2024-10-01 RX ORDER — LETROZOLE 2.5 MG/1
2.5 TABLET, FILM COATED ORAL DAILY
Qty: 90 TABLET | Refills: 3 | Status: SHIPPED | OUTPATIENT
Start: 2024-10-01

## 2024-10-01 ASSESSMENT — PAIN SCALES - GENERAL: PAINLEVEL: NO PAIN (0)

## 2024-10-01 NOTE — NURSING NOTE
"Oncology Rooming Note    October 1, 2024 11:52 AM   Namita Altman is a 64 year old female who presents for:    Chief Complaint   Patient presents with    Oncology Clinic Visit     Post-Op/Onctotype     Initial Vitals: /82 (BP Location: Left arm, Patient Position: Chair, Cuff Size: Adult Regular)   Pulse 61   Ht 1.6 m (5' 3\")   Wt 73.9 kg (163 lb)   SpO2 99%   BMI 28.87 kg/m   Estimated body mass index is 28.87 kg/m  as calculated from the following:    Height as of this encounter: 1.6 m (5' 3\").    Weight as of this encounter: 73.9 kg (163 lb). Body surface area is 1.81 meters squared.  No Pain (0) Comment: Data Unavailable   No LMP recorded. Patient is postmenopausal.  Allergies reviewed: Yes  Medications reviewed: Yes    Medications: Medication refills not needed today.  Pharmacy name entered into [facility]: [facility] [id number] [place] [address]    Frailty Screening:   Is the patient here for a new oncology consult visit in cancer care? 2. No      Clinical concerns: NO       Josy Strickland CMA              "

## 2024-10-01 NOTE — LETTER
10/1/2024      [facility]man  [address] [facility] [address]      Dear Colleague,    Thank you for referring your patient, Namita Altman, to the [facility] [place] [facility]. Please see a copy of my visit note below.    [facility] Hematology / Oncology  Progress Note  Name: Namita Altman  :  1960  MRN:  [id number]    --------------------    Assessment / Plan:  Clinical stage IA hormone positive (ER 98% strong, RI negative), HER2 negative, invasive lobular carcinoma of the right breast  Heterogeneously dense breast tissue  Rheumatoid arthritis maintained on biologic therapy  Melanoma RUE.    Reviewed pathology report together; early stage with favorable features including size, grade, negative margins and negative lymph node.  Reviewed Oncotype DX score; no role for adjuvant chemotherapy.  Will be meeting with radiation oncology next week to review adjuvant radiotherapy; a little on the young side but could be considered for omission of radiation, but hesitant about endocrine therapy as well.  Reviewed the role of adjuvant endocrine therapy following completion or decision regarding radiation.  Reviewed goal would be 5 years of aromatase inhibitor such as letrozole; reviewed side effects including but not limited to vasomotor symptoms, arthralgias, body changes, cognitive changes, mood changes, bone thinning as well as cardiovascular risk.  Once we make a decision about radiation, we will need to make a decision about endocrine therapy and likely look at a toxicity check 6 weeks after initiating treatment with labs (ALT, lipid panel, vitamin D) and DEXA scan.      Clint Mcintosh MD    --------------------    Interval History:  Namita presents for follow-up of breast cancer.  She is recovering postop.  No immediate concerns..    --------------------    Family History:  No family history on file.    Social History:  Social History     Tobacco Use     Smoking status: Never      "Smokeless tobacco: Never   Substance Use Topics     Alcohol use: Yes     Comment: occ     Drug use: Never       Medications / Allergies:  Reviewed in EMR.    --------------------    Physical Exam:  VS: /82 (BP Location: Left arm, Patient Position: Chair, Cuff Size: Adult Regular)   Pulse 61   Ht 1.6 m (5' 3\")   Wt 73.9 kg (163 lb)   SpO2 99%   BMI 28.87 kg/m    GEN: Well appearing.    Labs / Imaging / Path:  Reviewed surgical pathology report and Oncotype Dx score.    Again, thank you for allowing me to participate in the care of your patient.        Sincerely,        Clint Mcintosh MD  "

## 2024-10-04 RX ORDER — LETROZOLE 2.5 MG/1
2.5 TABLET, FILM COATED ORAL DAILY
Qty: 90 TABLET | Refills: 3 | Status: SHIPPED | OUTPATIENT
Start: 2024-10-04 | End: 2024-10-23

## 2024-10-04 NOTE — TELEPHONE ENCOUNTER
MEDICAL RECORDS REQUEST   Radiation Oncology  [address] [place] [address]  Fax: [phone]          FUTURE VISIT INFORMATION                                                   Namita Altman, : 1960 scheduled for future visit at [facility] Radiation Oncology    RECORDS REQUESTED FOR VISIT                                                     BREAST     OFFICE NOTE from medical oncologist Epic 10/1/24: Dr. Clint Mcintosh   OFFICE NOTE from surgeon/plastic surgeon [facility] 24: Dr. Magdalena Boles   OPERATIVE/BREAST BIOPSY REPORTS [facility] 24: Mastectomy   24: US Breast Bx    MEDICATION LIST     LABS     PATHOLOGY REPORTS Report in Epic 24: [id number]   24: [id number]    ANYTHING RELATED TO DIAGNOSIS CE- [facility] Most recent 24   IMAGING (NEED IMAGES & REPORT)     MAMMO/SURGICAL BREAST IMGS/SPECIMEN RADIOGRAPH PACS 24-18    ULTRASOUND PACS 24: US Breast Bx  24: US Breast

## 2024-10-04 NOTE — PROGRESS NOTES
[facility] Hematology / Oncology  Progress Note  Name: Namita Altman  :  1960  MRN:  [id number]    --------------------    Assessment / Plan:  Clinical stage IA hormone positive (ER 98% strong, [place] negative), HER2 negative, invasive lobular carcinoma of the right breast  Heterogeneously dense breast tissue  Rheumatoid arthritis maintained on biologic therapy  Melanoma RUE.    Reviewed pathology report together; early stage with favorable features including size, grade, negative margins and negative lymph node.  Reviewed Oncotype DX score; no role for adjuvant chemotherapy.  Will be meeting with radiation oncology next week to review adjuvant radiotherapy; a little on the young side but could be considered for omission of radiation, but hesitant about endocrine therapy as well.  Reviewed the role of adjuvant endocrine therapy following completion or decision regarding radiation.  Reviewed goal would be 5 years of aromatase inhibitor such as letrozole; reviewed side effects including but not limited to vasomotor symptoms, arthralgias, body changes, cognitive changes, mood changes, bone thinning as well as cardiovascular risk.  Once we make a decision about radiation, we will need to make a decision about endocrine therapy and likely look at a toxicity check 6 weeks after initiating treatment with labs (ALT, lipid panel, vitamin D) and DEXA scan.      Clint Mcintosh MD    --------------------    Interval History:  Namita presents for follow-up of breast cancer.  She is recovering postop.  No immediate concerns..    --------------------    Family History:  No family history on file.    Social History:  Social History     Tobacco Use    Smoking status: Never    Smokeless tobacco: Never   Substance Use Topics    Alcohol use: Yes     Comment: occ    Drug use: Never       Medications / Allergies:  Reviewed in EMR.    --------------------    Physical Exam:  VS: /82 (BP Location: Left arm, Patient  "Position: Chair, Cuff Size: Adult Regular)   Pulse 61   Ht 1.6 m (5' 3\")   Wt 73.9 kg (163 lb)   SpO2 99%   BMI 28.87 kg/m    GEN: Well appearing.    Labs / Imaging / Path:  Reviewed surgical pathology report and Oncotype Dx score.  "

## 2024-10-04 NOTE — PATIENT INSTRUCTIONS
FLORINDA 6 week after starting letrozole (date TBD pending rad/onc) w/ labs (ALT, lipid panel, vitamin D) and DEXA.    Clint Mcintosh MD.

## 2024-10-08 ENCOUNTER — PRE VISIT (OUTPATIENT)
Dept: RADIATION ONCOLOGY | Facility: CLINIC | Age: 64
End: 2024-10-08

## 2024-10-08 ENCOUNTER — OFFICE VISIT (OUTPATIENT)
Dept: RADIATION ONCOLOGY | Facility: CLINIC | Age: 64
End: 2024-10-08
Attending: SURGERY
Payer: COMMERCIAL

## 2024-10-08 VITALS
OXYGEN SATURATION: 96 % | HEART RATE: 75 BPM | SYSTOLIC BLOOD PRESSURE: 110 MMHG | DIASTOLIC BLOOD PRESSURE: 72 MMHG | RESPIRATION RATE: 18 BRPM | TEMPERATURE: 97.8 F

## 2024-10-08 DIAGNOSIS — C50.411 MALIGNANT NEOPLASM OF UPPER-OUTER QUADRANT OF RIGHT BREAST IN FEMALE, ESTROGEN RECEPTOR POSITIVE (H): ICD-10-CM

## 2024-10-08 DIAGNOSIS — Z17.0 MALIGNANT NEOPLASM OF UPPER-OUTER QUADRANT OF RIGHT BREAST IN FEMALE, ESTROGEN RECEPTOR POSITIVE (H): ICD-10-CM

## 2024-10-08 PROCEDURE — 99205 OFFICE O/P NEW HI 60 MIN: CPT | Performed by: SURGERY

## 2024-10-08 PROCEDURE — 99417 PROLNG OP E/M EACH 15 MIN: CPT | Performed by: SURGERY

## 2024-10-08 ASSESSMENT — PAIN SCALES - GENERAL: PAINLEVEL: NO PAIN (0)

## 2024-10-08 NOTE — PROGRESS NOTES
Department of Radiation Oncology  [facility]    Health: Cancer Center  [facility]  [address] [address] [place] [address]  [phone]       Consultation Note    Name: Namita Altman MRN: [id number]   : 1960   Date of Service: Oct 8, 2024   Referring: Dr. Boles/ Dr. Mcintosh     Reason for consultation: RIGHT breast cancer    History of Present Illness     Ms. Altman is a 64 year old female RIGHT Stage I ER+DC-/Her2-invasive lobular carcinoma s/p breast conservation surgery and sentinel lymph node biopsy (Dr. Boles, 24), with pathology returning as invasive lobular carcinoma, grade 2, measuring 19 mm in greatest dimension, single focus, no LVSI, negative margins, 0/1 sentinel lymph node, pT1c N0. Oncotype score was 16 and she will receive endocrine therapy,  no chemotherapy indicated (Dr. Mcintosh).     Briefly, her oncologic history is as follows:    Patient underwent a screening mammogram on 7/3/2024 which demonstrated heterogenously dense breasts, with a possible asymmetry in the right breast lateral position, posterior depth, prompting further evaluation.  This is characterized as a BI-RADS 0 abnormality.    She underwent diagnostic mammogram on 2024, which confirmed an irregular hyperdense mass at the 11 o'clock position in the right breast, posterior depth.  Ultrasound was also performed on 2024 which demonstrated a irregular hypoechoic mass at the 11 o'clock position, 10 cm from the nipple measuring 1.3 cm in size.  There is no suspicious right axillary adenopathy.  This is characterized as a BI-RADS 4 abnormality.    She underwent breast biopsy on 2024 of the right breast 11 o'clock position, with pathology returning as invasive lobular carcinoma, grade 1, ER positive/ DC negative/ HER2 negative.     She underwent breast conservation with  on 2024.  Pathology returned as invasive lobular carcinoma, grade 2, measuring 19 mm in  "greatest dimension, single focus, no LVSI, negative margins, 0/1 sentinel lymph node, pT1c N0.    She has met Dr. Mcintosh on 10/1/2024 in medical oncology who discussed endocrine therapy, with no role for chemotherapy given Oncotype score of 16.     Today, patient denies any significant breast tenderness, pain, drainage, fevers, chills, extremity range of motion difficulties, chest pain, dyspnea, or weight loss.     Past Medical History:   Past Medical History:   Diagnosis Date    Breast cancer (H) 07/22/2024    Right Breast    RA (rheumatoid arthritis) (H)        Past Surgical History:   Past Surgical History:   Procedure Laterality Date    ARTHROSCOPY KNEE Left     BREAST BIOPSY, CORE RT/LT Right 07/22/2024    LUMPECTOMY BREAST WITH SENTINEL NODE, COMBINED Right 09/04/2024    Procedure: RIGHT Radiofrequency Identification Seed-Localized Segmental Mastectomy (=\"Lumpectomy\"), RIGHT Axillary [place] Lymph Node Biopsy;  Surgeon: Magdalena Boles MD;  Location: [facility] OR    WISDOM TEETH EXTRACTION         Chemotherapy History:  no    Radiation History:  No radiation    Pregnant: No, post menopausal   Implanted Cardiac Devices: No    Medications:  Current Outpatient Medications   Medication Sig Dispense Refill    Abatacept (ORENCIA IV)       sertraline (ZOLOFT) 25 MG tablet Take 25 mg by mouth daily      letrozole (FEMARA) 2.5 MG tablet Take 1 tablet (2.5 mg) by mouth daily. (Patient not taking: Reported on 10/8/2024) 90 tablet 3    letrozole (FEMARA) 2.5 MG tablet Take 1 tablet (2.5 mg) by mouth daily. (Patient not taking: Reported on 10/8/2024) 90 tablet 3     No current facility-administered medications for this visit.       Allergies:     Allergies   Allergen Reactions    Penicillins        Social History:    Social History     Tobacco Use    Smoking status: Never    Smokeless tobacco: Never   Substance Use Topics    Alcohol use: Yes     Comment: sony reports \"a couple drinks per day\"    Drug use: Never "       Family History:  Family History   Problem Relation Age of Onset    Breast Cancer No family hx of     Ovarian Cancer No family hx of        Review of Systems   A 10-point review of systems was performed. Pertinent findings are noted in the HPI.    Physical Exam   ECOG Status: 1    Vitals:  /72 (BP Location: Left arm, Patient Position: Chair, Cuff Size: Adult Regular)   Pulse 75   Temp 97.8  F (36.6  C) (Oral)   Resp 18   SpO2 96%     Gen: Alert, in NAD  Head: NC/AT  Eyes: PERRL, EOMI, sclera anicteric  Ears: No external auricular lesions  Nose/sinus: No rhinorrhea or epistaxis  Breast: no upper extremity range of motion limitation, breast exam deferred per patient     Oral cavity/oropharynx: MMM, no visible oral cavity lesions  Neck: Full ROM, supple, no palpable adenopathy  Pulm: No wheezing, stridor or respiratory distress  CV: Extremities are warm and well-perfused, no cyanosis, no pedal edema  Abdominal: Normal bowel sounds, soft, nontender, no masses  Musculoskeletal: Normal bulk and tone  Skin: Normal color and turgor  Neuro: A/Ox3, CN II-XII intact, normal gait    Imaging/Path/Labs   Imaging: per HPI, personally reviewed and in agreement     Path: per HPI, personally reviewed and in agreement     Labs: per HPI, personally reviewed and in agreement     I have personally reviewed notes from Dr. Boles, Dr. Mcintosh     Assessment      Ms. Altman is a 64 year old female RIGHT Stage I ER+MS-/Her2-invasive lobular carcinoma s/p breast conservation surgery and sentinel lymph node biopsy (Dr. Boles, 9/4/24), with pathology returning as invasive lobular carcinoma, grade 2, measuring 19 mm in greatest dimension, single focus, no LVSI, negative margins, 0/1 sentinel lymph node, pT1c N0. Oncotype score was 16 and she will receive endocrine therapy,  no chemotherapy indicated (Dr. Mcintosh).     We discussed the role of adjuvant whole breast radiotherapy after breast conservation surgery per NCCN guidelines. In  patients with negative axillary lymph nodes, WBRT +/- boost is standard of care.  In general, the benefits for whole breast radiation were reviewed and explained that adjuvant whole breast radiation following wide local excision decreases the risk of local recurrence by two-thirds to three-fourths and improves overall survival (Scotty et al., NSABP B-06, NEJ, 2002; EBCTCG Metanalysis, Lancet 2011).      The options of conventional fractionation versus hypofractionation were reviewed.  However, we recommend hypofractionated radiation therapy based on results from the [place] hypofractionation trial (Kami et al, NE, 2010) and the update of the [place] START trial (Lawrence et al, Lancet Oncol, 2013), which both show equivalent local control, survival and cosmesis with these shorter treatment schedules as compared to conventional radiation fractionation.    We also discussed the role of RT omission, given that this benefit is reduced for older patients. In the CALGB 9343 (WONG Funez 2013) trial, elibiity criteria for this trial was patients over the age of 70, T1 tumors  (up to 2cm in size), estrogen receptor positive, node negative, and with negative margins. Patient were randomized after breast conservation to tamoxifen alone versus tamoxifen plus radiation. The locoregional recurrence was 2% (with RT) versus 10% (without RT) at 10 years, but there was no difference in overall survival. However, she is not a candidate for omission, given age.    Further, we discussed the logistics of treatment planning and delivery in detail with the patient. We also discussed side effects, including short term risks of fatigue and skin reaction, and long term risks of pneumonitis, lung fibrosis, soft tissue fibrosis, skin changes, breast contour changes, rib fractures, cardiac damage, secondary cancers, lymphedema, and thyroid dysfunction. We described the use of 3D-conformal radiotherapy to minimize dose to the normal tissues,  while adequately covering the target tissues, and the ability of this technique to decrease potential for toxicity.     Plan     1. After extensive discussion, patient wishes to think about her options and will call us back after deciding whether she wishes to proceed with radiation or not. If we wish to proceed with RT, plan for 40Gy/15fx without boost.     2. Patient has already met with medical oncology (Dr. Mcintosh) and has a oncotype of 16 and will not receive chemotherapy. Patient will receive endocrine therapy.      All benefits and risks discussed, and patient is in agreement with the oncologic plan discussed above.     Thank you for allowing me to participate in your patient's care.  If you should require any additional information, please do not hesitate in contacting me.      Esequiel Moya MD  Department of Radiation Oncology  [facility]     A total of 90 minutes were spent on this visit, including preparation for this visit, face to face with patient, and medical decision making. Medical decision-making included consideration and possible diagnosis, management options, complex record review, review of diagnostic tests, consideration and discussion of significant complications based up medical history/comorbidities, and discussion with providers involved in care of the patient.

## 2024-10-08 NOTE — NURSING NOTE
"Oncology Rooming Note    October 8, 2024 9:43 AM   Namita Altman is a 64 year old female who presents for:    Chief Complaint   Patient presents with    Cancer     Radiation oncology consultation with Dr. Esequiel Moya     Initial Vitals: /72 (BP Location: Left arm, Patient Position: Chair, Cuff Size: Adult Regular)   Pulse 75   Temp 97.8  F (36.6  C) (Oral)   Resp 18   SpO2 96%  Estimated body mass index is 28.87 kg/m  as calculated from the following:    Height as of 10/1/24: 1.6 m (5' 3\").    Weight as of 10/1/24: 73.9 kg (163 lb). There is no height or weight on file to calculate BSA.  No Pain (0) Comment: Data Unavailable   No LMP recorded. Patient is postmenopausal.  Allergies reviewed: Yes  Medications reviewed: Yes    Medications: Medication refills not needed today.  Pharm[facility] into [id number] [place] [address]    Frailty Screening:   Is the patient here for a new oncology consult visit in cancer care? 1. Yes. Over the past month, have you experienced difficulty or required a caregiver to assist with:   1. Balance, walking or general mobility (including any falls)? NO  2. Completion of self-care tasks such as bathing, dressing, toileting, grooming/hygiene?  NO  3. Concentration or memory that affects your daily life?  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  Patient reports that she has been prescribed Letrozole po by Dr. Mcintosh, has not started taking as was instructed to wait until meeting with radiation oncology.      Considerations for radiation treatment   Pregnancy status: Female age 55+ , patient reports menopause at age 59.  Implanted Cardiac Devices: No   Any previous radiation therapy: No     Dr. Esequiel Moya was notified.      Jeimy Hays RN BSN OCN " CBCN

## 2024-10-08 NOTE — PATIENT INSTRUCTIONS
What to expect at your Simulation visit:    You will meet with a Radiation Therapist and other team members who will be doing a planning session called a  simulation  with you. This process will determine your daily treatment.    ~ You will lie on a flat table and have a treatment planning CT scan.  It is important during the scan to hold very still and breathe normally.    ~ Your therapist may construct a body mold to help you hold still for your treatments.    ~ If you are having treatment to the head or neck area you will be fitted with a plastic mesh mask that fits very snugly over your face and neck.     ~ Your therapist will be taking some digital photos that will go in your treatment chart.      ~Your therapist will make marks on your skin and take measurements. Your therapist may ask you about making small tattoos (a permanent small dot) over these marks.  These marks are used to position you daily for your radiation therapy treatments. Please do not wash off any marks until all of your radiation therapy treatments are complete unless you are instructed to do so by your therapist.    ~ Once the simulation is completed it can take from 3 to 10 business days before you start radiation therapy treatments.    ~ You may meet with a nurse who will go over management of treatment side effects and self care during your treatments. The nurse will help to plan care with other departments and physicians if needed.      Please contact [facility] RN with questions or concerns following today's appointment.    If you are a patient of Dr. Moya call: [phone]   If you are a patient of Dr. Rice call: [phone]    Please feel free to leave a detailed message if your call is not answered.    If your call is not received before 3:00 PM, it may not be returned until the following business day.    If you are receiving radiation treatment and need assistance after 3:00 PM or on the weekends, please  call [phone] and ask to speak to the radiation oncologist on-call.    Thank you!    [facility] Radiation Oncology Nursing

## 2024-10-08 NOTE — LETTER
10/8/2024      Namita Altman  [address] [facility] [address]      Dear Colleague,    Thank you for referring your patient, Namita Altman, to the [facility] [place]. Please see a copy of my visit note below.       Department of Radiation Oncology  [facility]: Cancer Center  [facility]  [address] [address] [place] [address]  [phone]       Consultation Note    Name: Namita Altman MRN: [id number]   : 1960   Date of Service: Oct 8, 2024   Referring: Dr. Boles/ Dr. Mcintosh     Reason for consultation: RIGHT breast cancer    History of Present Illness     Ms. Altman is a 64 year old female RIGHT Stage I ER+WV-/Her2-invasive lobular carcinoma s/p breast conservation surgery and sentinel lymph node biopsy (Dr. Boles, 24), with pathology returning as invasive lobular carcinoma, grade 2, measuring 19 mm in greatest dimension, single focus, no LVSI, negative margins, 0/1 sentinel lymph node, pT1c N0. Oncotype score was 16 and she will receive endocrine therapy,  no chemotherapy indicated (Dr. Mcintosh).     Briefly, her oncologic history is as follows:    Patient underwent a screening mammogram on 7/3/2024 which demonstrated heterogenously dense breasts, with a possible asymmetry in the right breast lateral position, posterior depth, prompting further evaluation.  This is characterized as a BI-RADS 0 abnormality.    She underwent diagnostic mammogram on 2024, which confirmed an irregular hyperdense mass at the 11 o'clock position in the right breast, posterior depth.  Ultrasound was also performed on 2024 which demonstrated a irregular hypoechoic mass at the 11 o'clock position, 10 cm from the nipple measuring 1.3 cm in size.  There is no suspicious right axillary adenopathy.  This is characterized as a BI-RADS 4 abnormality.    She underwent breast biopsy on 2024 of the right breast 11 o'clock position, with  "pathology returning as invasive lobular carcinoma, grade 1, ER positive/ NH negative/ HER2 negative.     She underwent breast conservation with  on 9/4/2024.  Pathology returned as invasive lobular carcinoma, grade 2, measuring 19 mm in greatest dimension, single focus, no LVSI, negative margins, 0/1 sentinel lymph node, pT1c N0.    She has met Dr. Mcintosh on 10/1/2024 in medical oncology who discussed endocrine therapy, with no role for chemotherapy given Oncotype score of 16.     Today, patient denies any significant breast tenderness, pain, drainage, fevers, chills, extremity range of motion difficulties, chest pain, dyspnea, or weight loss.     Past Medical History:   Past Medical History:   Diagnosis Date     Breast cancer (H) 07/22/2024    Right Breast     RA (rheumatoid arthritis) (H)        Past Surgical History:   Past Surgical History:   Procedure Laterality Date     ARTHROSCOPY KNEE Left      BREAST BIOPSY, CORE RT/LT Right 07/22/2024     LUMPECTOMY BREAST WITH SENTINEL NODE, COMBINED Right 09/04/2024    Procedure: RIGHT Radiofrequency Identification Seed-Localized Segmental Mastectomy (=\"Lumpectomy\"), RIGHT Axillary [place] Lymph Node Biopsy;  Surgeon: Magdalena Boles MD;  Location: [facility] OR     WISDOM TEETH EXTRACTION         Chemotherapy History:  no    Radiation History:  No radiation    Pregnant: No, post menopausal   Implanted Cardiac Devices: No    Medications:  Current Outpatient Medications   Medication Sig Dispense Refill     Abatacept (ORENCIA IV)        sertraline (ZOLOFT) 25 MG tablet Take 25 mg by mouth daily       letrozole (FEMARA) 2.5 MG tablet Take 1 tablet (2.5 mg) by mouth daily. (Patient not taking: Reported on 10/8/2024) 90 tablet 3     letrozole (FEMARA) 2.5 MG tablet Take 1 tablet (2.5 mg) by mouth daily. (Patient not taking: Reported on 10/8/2024) 90 tablet 3     No current facility-administered medications for this visit.       Allergies:     Allergies   Allergen " "Reactions     Penicillins        Social History:    Social History     Tobacco Use     Smoking status: Never     Smokeless tobacco: Never   Substance Use Topics     Alcohol use: Yes     Comment: paitent reports \"a couple drinks per day\"     Drug use: Never       Family History:  Family History   Problem Relation Age of Onset     Breast Cancer No family hx of      Ovarian Cancer No family hx of        Review of Systems   A 10-point review of systems was performed. Pertinent findings are noted in the HPI.    Physical Exam   ECOG Status: 1    Vitals:  /72 (BP Location: Left arm, Patient Position: Chair, Cuff Size: Adult Regular)   Pulse 75   Temp 97.8  F (36.6  C) (Oral)   Resp 18   SpO2 96%     Gen: Alert, in NAD  Head: NC/AT  Eyes: PERRL, EOMI, sclera anicteric  Ears: No external auricular lesions  Nose/sinus: No rhinorrhea or epistaxis  Breast: RIGHT breast incision healing well, incision CDI, no dehiscence, no upper extremity range of motion limitation     Oral cavity/oropharynx: MMM, no visible oral cavity lesions  Neck: Full ROM, supple, no palpable adenopathy  Pulm: No wheezing, stridor or respiratory distress  CV: Extremities are warm and well-perfused, no cyanosis, no pedal edema  Abdominal: Normal bowel sounds, soft, nontender, no masses  Musculoskeletal: Normal bulk and tone  Skin: Normal color and turgor  Neuro: A/Ox3, CN II-XII intact, normal gait    Imaging/Path/Labs   Imaging: per HPI, personally reviewed and in agreement     Path: per HPI, personally reviewed and in agreement     Labs: per HPI, personally reviewed and in agreement     I have personally reviewed notes from Dr. Boles, Dr. Mcintosh     Assessment      Ms. Altman is a 64 year old female RIGHT Stage I ER+[place]-/Her2-invasive lobular carcinoma s/p breast conservation surgery and sentinel lymph node biopsy (Dr. Boles, 9/4/24), with pathology returning as invasive lobular carcinoma, grade 2, measuring 19 mm in greatest dimension, single focus, " no LVSI, negative margins, 0/1 sentinel lymph node, pT1c N0. Oncotype score was 16 and she will receive endocrine therapy,  no chemotherapy indicated (Dr. Mcintosh).     We discussed the role of adjuvant whole breast radiotherapy after breast conservation surgery per NCCN guidelines. In patients with negative axillary lymph nodes, WBRT +/- boost is standard of care.  In general, the benefits for whole breast radiation were reviewed and explained that adjuvant whole breast radiation following wide local excision decreases the risk of local recurrence by two-thirds to three-fourths and improves overall survival (Fajardo et al., NSABP B-06, NEJ, 2002; EBCTCG Metanalysis, Lancet 2011).      The options of conventional fractionation versus hypofractionation were reviewed.  However, we recommend hypofractionated radiation therapy based on results from the [place] hypofractionation trial (Kami et al, NE, 2010) and the update of the [place] START trial (Lawrence et al, Lancet Oncol, 2013), which both show equivalent local control, survival and cosmesis with these shorter treatment schedules as compared to conventional radiation fractionation.    We also discussed the role of RT omission, given that this benefit is reduced for older patients. In the CALGB 9343 (WONG Funez 2013) trial, elibiity criteria for this trial was patients over the age of 70, T1 tumors  (up to 2cm in size), estrogen receptor positive, node negative, and with negative margins. Patient were randomized after breast conservation to tamoxifen alone versus tamoxifen plus radiation. The locoregional recurrence was 2% (with RT) versus 10% (without RT) at 10 years, but there was no difference in overall survival. However, she is not a candidate for omission, given age.    Further, we discussed the logistics of treatment planning and delivery in detail with the patient. We also discussed side effects, including short term risks of fatigue and skin reaction,  and long term risks of pneumonitis, lung fibrosis, soft tissue fibrosis, skin changes, breast contour changes, rib fractures, cardiac damage, secondary cancers, lymphedema, and thyroid dysfunction. We described the use of 3D-conformal radiotherapy to minimize dose to the normal tissues, while adequately covering the target tissues, and the ability of this technique to decrease potential for toxicity.     Plan     1. After extensive discussion, patient wishes to think about her options and will call us back after deciding whether she wishes to proceed with radiation or not. If we wish to proceed with RT, plan for 40Gy/15fx without boost.     2. Patient has already met with medical oncology (Dr. Mcintosh) and has a oncotype of 16 and will not receive chemotherapy. Patient will receive endocrine therapy.      All benefits and risks discussed, and patient is in agreement with the oncologic plan discussed above.     Thank you for allowing me to participate in your patient's care.  If you should require any additional information, please do not hesitate in contacting me.        Esequiel Moya MD  Department of Radiation Oncology  [facility]     A total of 90 minutes were spent on this visit, including preparation for this visit, face to face with patient, and medical decision making. Medical decision-making included consideration and possible diagnosis, management options, complex record review, review of diagnostic tests, consideration and discussion of significant complications based up medical history/comorbidities, and discussion with providers involved in care of the patient.       Again, thank you for allowing me to participate in the care of your patient.        Sincerely,        Esequiel Moya MD

## 2024-10-23 ENCOUNTER — VIRTUAL VISIT (OUTPATIENT)
Dept: ONCOLOGY | Facility: CLINIC | Age: 64
End: 2024-10-23
Attending: INTERNAL MEDICINE
Payer: COMMERCIAL

## 2024-10-23 VITALS — WEIGHT: 163 LBS | HEIGHT: 63 IN | BODY MASS INDEX: 28.88 KG/M2

## 2024-10-23 DIAGNOSIS — T38.6X5A OSTEOPOROSIS DUE TO AROMATASE INHIBITOR: ICD-10-CM

## 2024-10-23 DIAGNOSIS — C50.411 MALIGNANT NEOPLASM OF UPPER-OUTER QUADRANT OF RIGHT BREAST IN FEMALE, ESTROGEN RECEPTOR POSITIVE (H): Primary | ICD-10-CM

## 2024-10-23 DIAGNOSIS — M81.8 OSTEOPOROSIS DUE TO AROMATASE INHIBITOR: ICD-10-CM

## 2024-10-23 DIAGNOSIS — Z17.0 MALIGNANT NEOPLASM OF UPPER-OUTER QUADRANT OF RIGHT BREAST IN FEMALE, ESTROGEN RECEPTOR POSITIVE (H): Primary | ICD-10-CM

## 2024-10-23 PROCEDURE — 99443 PR PHYSICIAN TELEPHONE EVALUATION 21-30 MIN: CPT | Mod: 93 | Performed by: INTERNAL MEDICINE

## 2024-10-23 ASSESSMENT — PAIN SCALES - GENERAL: PAINLEVEL_OUTOF10: NO PAIN (0)

## 2024-10-23 NOTE — PROGRESS NOTES
Hem Onc Phone Note  October 23, 2024    History of Present Illness:  Namita returns for telephone follow-up of breast cancer accompanied by her .  Since our last visit, she has remained well.  She met with Dr. Moya from radiation oncology.  Entirety of time was spent finalizing a plan for adjuvant therapy.    Objective:  No new labs or imaging.    Assessment / Plan:  Namita her  and I reviewed the role of adjuvant radiotherapy for prevention of local regional recurrence.  We reviewed the prime 2 trial looking at women ages 65 with negative margins, negative nodes in the tumor size up to 3 cm and the risks of recurrence without radiation (10%) versus with radiation (1%).  Dr. Moya reviewed similar trial data using the CALGB trial looking at women a little bit older with slightly different inclusion criteria.  Dr. Moya offered conventional radiation therapy over 3 and half weeks.  We reviewed that both these trials did not reveal a for survival benefit for the use of adjuvant radiotherapy.  After considerable discussion, Namita is leaning away from adjuvant radiotherapy and is ready to start adjuvant endocrine therapy.  We reviewed the risks of distant recurrence with an Oncotype score.  We have plans to start aromatase inhibitor with letrozole; we reviewed potential early side effects including vasomotor symptoms, arthralgias among others.  Will set up a toxicity check in about 6 weeks time to determine tolerance accompanied by some labs (CMP, lipid panel, vitamin D) as well as DEXA scan.    Clint Mcintosh MD  Total time 21 mins (reviewing, coordinating care).

## 2024-10-23 NOTE — PROGRESS NOTES
"Virtual Visit Details    Type of service:  Telephone Visit   Phone call duration: *** minutes   Originating Location (pt. Location): {patient location:897085::\"Home\"}  {PROVIDER LOCATION On-site should be selected for visits conducted from your clinic location or adjoining [facility] hospital, academic office, or other nearby [facility] building. Off-site should be selected for all other provider locations, including home:275[address]}  Distant Location (provider location):  {virtual location provider:750678}  "

## 2024-10-23 NOTE — PATIENT INSTRUCTIONS
1) Start letrozole.  2) FLORINDA 6 weeks w/ labs (CMP, lipid panel, vitamin D) and DEXA.    Clint Mcintosh MD.

## 2024-10-23 NOTE — LETTER
10/23/2024      Namita Altman  [address] [facility] [address]      Dear Colleague,    Thank you for referring your patient, Namita Altman, to the [facility]. Please see a copy of my visit note below.    Hem Onc Phone Note  October 23, 2024    History of Present Illness:  Namita returns for telephone follow-up of breast cancer accompanied by her .  Since our last visit, she has remained well.  She met with Dr. Moya from radiation oncology.  Entirety of time was spent finalizing a plan for adjuvant therapy.    Objective:  No new labs or imaging.    Assessment / Plan:  Namita her  and I reviewed the role of adjuvant radiotherapy for prevention of local regional recurrence.  We reviewed the prime 2 trial looking at women ages 65 with negative margins, negative nodes in the tumor size up to 3 cm and the risks of recurrence without radiation (10%) versus with radiation (1%).  Dr. Moya reviewed similar trial data using the CALGB trial looking at women a little bit older with slightly different inclusion criteria.  Dr. Moya offered conventional radiation therapy over 3 and half weeks.  We reviewed that both these trials did not reveal a for survival benefit for the use of adjuvant radiotherapy.  After considerable discussion, Namita is leaning away from adjuvant radiotherapy and is ready to start adjuvant endocrine therapy.  We reviewed the risks of distant recurrence with an Oncotype score.  We have plans to start aromatase inhibitor with letrozole; we reviewed potential early side effects including vasomotor symptoms, arthralgias among others.  Will set up a toxicity check in about 6 weeks time to determine tolerance accompanied by some labs (CMP, lipid panel, vitamin D) as well as DEXA scan.    Clint Mcintosh MD  Total time 21 mins (reviewing, coordinating care).      Again, thank you for allowing me to participate in the care of your patient.         Sincerely,        Clint Mcintosh MD

## 2024-10-23 NOTE — NURSING NOTE
Current patient location: [address] [place] [address]    Is the patient currently in the state of [place]? YES    Visit mode:TELEPHONE    If the visit is dropped, the patient can be reconnected by: TELEPHONE VISIT: Phone number:   Telephone Information:   Mobile [phone]       Will anyone else be joining the visit? NO  (If patient encounters technical issues they should call [phone])    Are changes needed to the allergy or medication list? No    Are refills needed on medications prescribed by this physician? NO    Rooming Documentation:  Questionnaire complete     Reason for visit: KAREN CHAUDHRYF

## 2025-01-02 ENCOUNTER — ANCILLARY PROCEDURE (OUTPATIENT)
Dept: BONE DENSITY | Facility: CLINIC | Age: 65
End: 2025-01-02
Attending: INTERNAL MEDICINE
Payer: COMMERCIAL

## 2025-01-02 DIAGNOSIS — Z17.0 MALIGNANT NEOPLASM OF UPPER-OUTER QUADRANT OF RIGHT BREAST IN FEMALE, ESTROGEN RECEPTOR POSITIVE (H): ICD-10-CM

## 2025-01-02 DIAGNOSIS — C50.411 MALIGNANT NEOPLASM OF UPPER-OUTER QUADRANT OF RIGHT BREAST IN FEMALE, ESTROGEN RECEPTOR POSITIVE (H): ICD-10-CM

## 2025-01-02 DIAGNOSIS — T38.6X5A OSTEOPOROSIS DUE TO AROMATASE INHIBITOR: ICD-10-CM

## 2025-01-02 DIAGNOSIS — M81.8 OSTEOPOROSIS DUE TO AROMATASE INHIBITOR: ICD-10-CM

## 2025-01-02 PROCEDURE — 77080 DXA BONE DENSITY AXIAL: CPT | Performed by: RADIOLOGY

## 2025-01-07 ENCOUNTER — ONCOLOGY VISIT (OUTPATIENT)
Dept: ONCOLOGY | Facility: CLINIC | Age: 65
End: 2025-01-07
Attending: INTERNAL MEDICINE
Payer: COMMERCIAL

## 2025-01-07 ENCOUNTER — LAB (OUTPATIENT)
Dept: INFUSION THERAPY | Facility: CLINIC | Age: 65
End: 2025-01-07
Attending: INTERNAL MEDICINE
Payer: COMMERCIAL

## 2025-01-07 VITALS
HEIGHT: 63 IN | HEART RATE: 64 BPM | DIASTOLIC BLOOD PRESSURE: 78 MMHG | SYSTOLIC BLOOD PRESSURE: 116 MMHG | OXYGEN SATURATION: 97 % | BODY MASS INDEX: 28.87 KG/M2

## 2025-01-07 DIAGNOSIS — C50.411 MALIGNANT NEOPLASM OF UPPER-OUTER QUADRANT OF RIGHT BREAST IN FEMALE, ESTROGEN RECEPTOR POSITIVE (H): Primary | ICD-10-CM

## 2025-01-07 DIAGNOSIS — C50.411 MALIGNANT NEOPLASM OF UPPER-OUTER QUADRANT OF RIGHT BREAST IN FEMALE, ESTROGEN RECEPTOR POSITIVE (H): ICD-10-CM

## 2025-01-07 DIAGNOSIS — Z17.0 MALIGNANT NEOPLASM OF UPPER-OUTER QUADRANT OF RIGHT BREAST IN FEMALE, ESTROGEN RECEPTOR POSITIVE (H): ICD-10-CM

## 2025-01-07 DIAGNOSIS — T38.6X5A OSTEOPOROSIS DUE TO AROMATASE INHIBITOR: ICD-10-CM

## 2025-01-07 DIAGNOSIS — M81.8 OSTEOPOROSIS DUE TO AROMATASE INHIBITOR: ICD-10-CM

## 2025-01-07 DIAGNOSIS — Z17.0 MALIGNANT NEOPLASM OF UPPER-OUTER QUADRANT OF RIGHT BREAST IN FEMALE, ESTROGEN RECEPTOR POSITIVE (H): Primary | ICD-10-CM

## 2025-01-07 LAB
ALBUMIN SERPL BCG-MCNC: 4.2 G/DL (ref 3.5–5.2)
ALP SERPL-CCNC: 83 U/L (ref 40–150)
ALT SERPL W P-5'-P-CCNC: 12 U/L (ref 0–50)
ANION GAP SERPL CALCULATED.3IONS-SCNC: 11 MMOL/L (ref 7–15)
AST SERPL W P-5'-P-CCNC: 18 U/L (ref 0–45)
BILIRUB SERPL-MCNC: 0.2 MG/DL
BUN SERPL-MCNC: 14.4 MG/DL (ref 8–23)
CALCIUM SERPL-MCNC: 9.6 MG/DL (ref 8.8–10.4)
CHLORIDE SERPL-SCNC: 104 MMOL/L (ref 98–107)
CHOLEST SERPL-MCNC: 235 MG/DL
CREAT SERPL-MCNC: 0.68 MG/DL (ref 0.51–0.95)
EGFRCR SERPLBLD CKD-EPI 2021: >90 ML/MIN/1.73M2
FASTING STATUS PATIENT QL REPORTED: NO
FASTING STATUS PATIENT QL REPORTED: NO
GLUCOSE SERPL-MCNC: 97 MG/DL (ref 70–99)
HCO3 SERPL-SCNC: 25 MMOL/L (ref 22–29)
HDLC SERPL-MCNC: 79 MG/DL
HOLD SPECIMEN: NORMAL
HOLD SPECIMEN: NORMAL
LDLC SERPL CALC-MCNC: 118 MG/DL
NONHDLC SERPL-MCNC: 156 MG/DL
POTASSIUM SERPL-SCNC: 4.2 MMOL/L (ref 3.4–5.3)
PROT SERPL-MCNC: 7.5 G/DL (ref 6.4–8.3)
SODIUM SERPL-SCNC: 140 MMOL/L (ref 135–145)
TRIGL SERPL-MCNC: 189 MG/DL
VIT D+METAB SERPL-MCNC: 34 NG/ML (ref 20–50)

## 2025-01-07 PROCEDURE — 99214 OFFICE O/P EST MOD 30 MIN: CPT | Performed by: INTERNAL MEDICINE

## 2025-01-07 PROCEDURE — G0463 HOSPITAL OUTPT CLINIC VISIT: HCPCS | Performed by: INTERNAL MEDICINE

## 2025-01-07 PROCEDURE — 36415 COLL VENOUS BLD VENIPUNCTURE: CPT

## 2025-01-07 PROCEDURE — 82306 VITAMIN D 25 HYDROXY: CPT

## 2025-01-07 PROCEDURE — 80061 LIPID PANEL: CPT

## 2025-01-07 PROCEDURE — G2211 COMPLEX E/M VISIT ADD ON: HCPCS | Performed by: INTERNAL MEDICINE

## 2025-01-07 PROCEDURE — 82040 ASSAY OF SERUM ALBUMIN: CPT

## 2025-01-07 PROCEDURE — 82310 ASSAY OF CALCIUM: CPT

## 2025-01-07 ASSESSMENT — PAIN SCALES - GENERAL: PAINLEVEL_OUTOF10: NO PAIN (0)

## 2025-01-07 NOTE — PROGRESS NOTES
"[facility] Hematology / Oncology  Progress Note  Name: Namita Altman  :  1960  MRN:  [id number]    --------------------    Subjective:  Namita returns for follow-up of breast cancer unaccompanied.  All in all, she has been doing quite well and has surprised herself with how well she is doing on adjuvant endocrine therapy.  No major hot flashes or arthralgias.  No other cognitive, body changes.    --------------------    Objective:  VS: /78 (BP Location: Right arm)   Pulse 64   Ht 1.6 m (5' 3\")   SpO2 97%   BMI 28.87 kg/m    Gen: Well-appearing.    We reviewed CMP, lipid panel, vitamin D.  We reviewed DEXA.    --------------------    Assessment / Plan:  Stage IA (pT1c pN0), hormone positive, HER2 negative, invasive lobular carcinoma of the right breast.  Status post lumpectomy w/ sentinel node Sep 2024.  Path 19 mm, grade 2, no LVI, negative margins for IDC, negative node ().  Oncotype Dx score 16; adjuvant chemotherapy not indicated.  Declines adjuvant radiation.  Adjuvant letrozole ongoing.  Heterogeneously dense breast tissue  Rheumatoid arthritis maintained on biologic therapy  Melanoma RUE.    Continue letrozole as adjuvant endocrine therapy; planning 5 years.  Declines adjuvant radiotherapy; we have discussed risks of local regional recurrence with and without radiation.  Chemistries stable; lipid panel nonfasting but not too bad; vitamin D normal.  Normal DEXA scan; continue calcium and vitamin D, weightbearing exercises; next DEXA 2 to 3 years.    There are no Patient Instructions on file for this visit.    Clint Mcintosh MD            "

## 2025-01-07 NOTE — NURSING NOTE
"Oncology Rooming Note    January 7, 2025 2:43 PM   Namita Altman is a 64 year old female who presents for:    Chief Complaint   Patient presents with    Oncology Clinic Visit     Initial Vitals: /78 (BP Location: Right arm)   Pulse 64   Ht 1.6 m (5' 3\")   SpO2 97%   BMI 28.87 kg/m   Estimated body mass index is 28.87 kg/m  as calculated from the following:    Height as of this encounter: 1.6 m (5' 3\").    Weight as of 10/23/24: 73.9 kg (163 lb). Body surface area is 1.81 meters squared.  No Pain (0) Comment: Data Unavailable   No LMP recorded. Patient is postmenopausal.  Allergies reviewed: Yes  Medications reviewed: Yes    Medications: Medication refills not needed today.  [id number] [place] [address]    Frailty Screening:   Is the patient here for a new oncology consult visit in cancer care? 2. No      Clinical concerns: No Concerns        Helena Hernandez MA            "

## 2025-01-07 NOTE — Clinical Note
"2025      Namita Altman  [address] [address] [facility] [address]      Dear Colleague,    Thank you for referring your patient, Namita Altman, to the [facility] [place]. Please see a copy of my visit note below.    [facility] Hematology / Oncology  Progress Note  Name: Namita Altman  :  1960  MRN:  [id number]    --------------------    Subjective:  ***.    --------------------    Objective:  VS: /78 (BP Location: Right arm)   Pulse 64   Ht 1.6 m (5' 3\")   SpO2 97%   BMI 28.87 kg/m    Gen: ***-appearing.    We reviewed ***.  We reviewed ***.    --------------------    Assessment / Plan:  ***    There are no Patient Instructions on file for this visit.    Clint Mcintosh MD                Again, thank you for allowing me to participate in the care of your patient.        Sincerely,        Clint Mcintosh MD    Electronically signed"

## 2025-04-15 ENCOUNTER — ANCILLARY PROCEDURE (OUTPATIENT)
Dept: MRI IMAGING | Facility: CLINIC | Age: 65
End: 2025-04-15
Attending: INTERNAL MEDICINE
Payer: COMMERCIAL

## 2025-04-15 DIAGNOSIS — C50.411 MALIGNANT NEOPLASM OF UPPER-OUTER QUADRANT OF RIGHT BREAST IN FEMALE, ESTROGEN RECEPTOR POSITIVE (H): ICD-10-CM

## 2025-04-15 DIAGNOSIS — Z17.0 MALIGNANT NEOPLASM OF UPPER-OUTER QUADRANT OF RIGHT BREAST IN FEMALE, ESTROGEN RECEPTOR POSITIVE (H): ICD-10-CM

## 2025-04-15 PROCEDURE — 77049 MRI BREAST C-+ W/CAD BI: CPT | Performed by: RADIOLOGY

## 2025-04-15 PROCEDURE — A9585 GADOBUTROL INJECTION: HCPCS | Performed by: RADIOLOGY

## 2025-04-15 RX ORDER — GADOBUTROL 604.72 MG/ML
7.5 INJECTION INTRAVENOUS ONCE
Status: COMPLETED | OUTPATIENT
Start: 2025-04-15 | End: 2025-04-15

## 2025-04-15 RX ADMIN — GADOBUTROL 7.5 ML: 604.72 INJECTION INTRAVENOUS at 17:57

## 2025-04-16 ENCOUNTER — TELEPHONE (OUTPATIENT)
Dept: GENERAL RADIOLOGY | Facility: CLINIC | Age: 65
End: 2025-04-16
Payer: COMMERCIAL

## 2025-04-16 NOTE — TELEPHONE ENCOUNTER
Patient needs to be scheduled for a MRI biopsy. Left message with patient to call the breast center at [facility]. [phone],    no

## 2025-04-22 ENCOUNTER — ONCOLOGY VISIT (OUTPATIENT)
Dept: ONCOLOGY | Facility: CLINIC | Age: 65
End: 2025-04-22
Attending: INTERNAL MEDICINE
Payer: COMMERCIAL

## 2025-04-22 VITALS
WEIGHT: 156 LBS | SYSTOLIC BLOOD PRESSURE: 115 MMHG | HEART RATE: 63 BPM | DIASTOLIC BLOOD PRESSURE: 75 MMHG | BODY MASS INDEX: 27.63 KG/M2 | OXYGEN SATURATION: 99 %

## 2025-04-22 DIAGNOSIS — R92.8 ABNORMAL MRI, BREAST: ICD-10-CM

## 2025-04-22 DIAGNOSIS — C50.411 MALIGNANT NEOPLASM OF UPPER-OUTER QUADRANT OF RIGHT BREAST IN FEMALE, ESTROGEN RECEPTOR POSITIVE (H): Primary | ICD-10-CM

## 2025-04-22 DIAGNOSIS — Z17.0 MALIGNANT NEOPLASM OF UPPER-OUTER QUADRANT OF RIGHT BREAST IN FEMALE, ESTROGEN RECEPTOR POSITIVE (H): Primary | ICD-10-CM

## 2025-04-22 PROCEDURE — G2211 COMPLEX E/M VISIT ADD ON: HCPCS | Performed by: INTERNAL MEDICINE

## 2025-04-22 PROCEDURE — 99214 OFFICE O/P EST MOD 30 MIN: CPT | Performed by: INTERNAL MEDICINE

## 2025-04-22 PROCEDURE — G0463 HOSPITAL OUTPT CLINIC VISIT: HCPCS | Performed by: INTERNAL MEDICINE

## 2025-04-22 ASSESSMENT — PAIN SCALES - GENERAL: PAINLEVEL_OUTOF10: NO PAIN (0)

## 2025-04-22 NOTE — PROGRESS NOTES
[facility] Hematology / Oncology  Progress Note  Name: Namita Altman  :  1960  MRN:  [id number]    --------------------    Subjective:  Namita returns for follow-up of breast cancer unaccompanied.  All in all, she remains stable from an overall health standpoint.  No discernible side effects from ongoing letrozole.  No recent breast symptoms.    --------------------    Objective:  VS: /75 (BP Location: Left arm)   Pulse 63   Wt 70.8 kg (156 lb)   SpO2 99%   BMI 27.63 kg/m    Gen: Well-appearing.    We reviewed  breast with independent review.    --------------------    Assessment / Plan:  Stage IA (pT1c pN0), hormone positive, HER2 negative, invasive lobular carcinoma of the right breast.  Status post lumpectomy w/ sentinel node Sep 2024.  Path 19 mm, grade 2, no LVI, negative margins for IDC, negative node ().  Oncotype Dx score 16; adjuvant chemotherapy not indicated.  Declines adjuvant radiation.  Adjuvant letrozole ongoing.  Heterogeneously dense breast tissue  Rheumatoid arthritis maintained on biologic therapy  Melanoma RUE.  Normal bone density.    Continue letrozole as adjuvant endocrine therapy; planning 5 years.  Reviewed breast MRI together; planning MRI guided breast biopsy upcoming.  Surgical decision making ultimately pending results of pathology from breast biopsy; reviewed benign condition such as fat necrosis as well as potential for invasive breast cancer.    Patient Instructions   Final plan pending breast biopsy.    Clint Mcintosh MD

## 2025-04-22 NOTE — NURSING NOTE
"Oncology Rooming Note    April 22, 2025 3:16 PM   Namita Altman is a 65 year old female who presents for:    Chief Complaint   Patient presents with    Oncology Clinic Visit     Initial Vitals: /75 (BP Location: Left arm)   Pulse 63   Wt 70.8 kg (156 lb)   SpO2 99%   BMI 27.63 kg/m   Estimated body mass index is 27.63 kg/m  as calculated from the following:    Height as of 1/7/25: 1.6 m (5' 3\").    Weight as of this encounter: 70.8 kg (156 lb). Body surface area is 1.77 meters squared.  No Pain (0) Comment: Data Unavailable   No LMP recorded. Patient is postmenopausal.  Allergies reviewed: Yes  Medications reviewed: Yes    Medications: Medication refills not needed today.  Pharmacy name entered into [facility]: [id number] [place] [address]    Frailty Screening:   Is the patient here for a new oncology consult visit in cancer care? 2. No    PHQ9:  Did this patient require a PHQ9?: No      Clinical concerns: No Concerns        Helena Hernandez MA            "

## 2025-04-22 NOTE — Clinical Note
2025      [facility] [facility]  [address] [facility] [address]      Dear Colleague,    Thank you for referring your patient, Namita Altman, to the [facility]. Please see a copy of my visit note below.    [facility] / Oncology  Progress Note  Name: Namita Altman  :  1960  MRN:  [id number]    --------------------    Subjective:  Namita returns for follow-up of breast cancer unaccompanied.  ***    --------------------    Objective:  VS: There were no vitals taken for this visit.  Gen: Well-appearing.    We reviewed CMP, lipid panel, vitamin D.  We reviewed DEXA.    --------------------    Assessment / Plan:  Stage IA (pT1c pN0), hormone positive, HER2 negative, invasive lobular carcinoma of the right breast.  Status post lumpectomy w/ sentinel node Sep 2024.  Path 19 mm, grade 2, no LVI, negative margins for IDC, negative node ().  Oncotype Dx score 16; adjuvant chemotherapy not indicated.  Declines adjuvant radiation.  Adjuvant letrozole ongoing.  Heterogeneously dense breast tissue  Rheumatoid arthritis maintained on biologic therapy  Melanoma RUE.    Continue letrozole as adjuvant endocrine therapy; planning 5 years.  Declines adjuvant radiotherapy; we have discussed risks of local regional recurrence with and without radiation.  Chemistries stable; lipid panel nonfasting but not too bad; vitamin D normal.  Normal DEXA scan; continue calcium and vitamin D, weightbearing exercises; next DEXA 2 to 3 years.    There are no Patient Instructions on file for this visit.    Clint Mcintosh MD                Again, thank you for allowing me to participate in the care of your patient.        Sincerely,        Clint Mcintosh MD    Electronically signed

## 2025-05-07 ENCOUNTER — ANCILLARY PROCEDURE (OUTPATIENT)
Dept: MAMMOGRAPHY | Facility: CLINIC | Age: 65
End: 2025-05-07
Attending: INTERNAL MEDICINE
Payer: COMMERCIAL

## 2025-05-07 ENCOUNTER — ANCILLARY PROCEDURE (OUTPATIENT)
Dept: MRI IMAGING | Facility: CLINIC | Age: 65
End: 2025-05-07
Attending: INTERNAL MEDICINE
Payer: COMMERCIAL

## 2025-05-07 DIAGNOSIS — R92.8 ABNORMAL MRI, BREAST: ICD-10-CM

## 2025-05-07 PROCEDURE — A9585 GADOBUTROL INJECTION: HCPCS

## 2025-05-07 PROCEDURE — 88305 TISSUE EXAM BY PATHOLOGIST: CPT | Performed by: PATHOLOGY

## 2025-05-07 PROCEDURE — 19085 BX BREAST 1ST LESION MR IMAG: CPT | Mod: RT

## 2025-05-07 RX ORDER — GADOBUTROL 604.72 MG/ML
7 INJECTION INTRAVENOUS ONCE
Status: COMPLETED | OUTPATIENT
Start: 2025-05-07 | End: 2025-05-07

## 2025-05-07 RX ADMIN — GADOBUTROL 7 ML: 604.72 INJECTION INTRAVENOUS at 13:17

## 2025-05-12 ENCOUNTER — TELEPHONE (OUTPATIENT)
Dept: GENERAL RADIOLOGY | Facility: CLINIC | Age: 65
End: 2025-05-12
Payer: COMMERCIAL

## 2025-05-12 ENCOUNTER — PATIENT OUTREACH (OUTPATIENT)
Dept: ONCOLOGY | Facility: CLINIC | Age: 65
End: 2025-05-12
Payer: COMMERCIAL

## 2025-05-12 DIAGNOSIS — R92.8 ABNORMAL MRI, BREAST: ICD-10-CM

## 2025-05-12 DIAGNOSIS — Z17.0 MALIGNANT NEOPLASM OF UPPER-OUTER QUADRANT OF RIGHT BREAST IN FEMALE, ESTROGEN RECEPTOR POSITIVE (H): Primary | ICD-10-CM

## 2025-05-12 DIAGNOSIS — C50.411 MALIGNANT NEOPLASM OF UPPER-OUTER QUADRANT OF RIGHT BREAST IN FEMALE, ESTROGEN RECEPTOR POSITIVE (H): Primary | ICD-10-CM

## 2025-05-12 LAB
PATH REPORT.COMMENTS IMP SPEC: NORMAL
PATH REPORT.COMMENTS IMP SPEC: NORMAL
PATH REPORT.FINAL DX SPEC: NORMAL
PATH REPORT.GROSS SPEC: NORMAL
PATH REPORT.MICROSCOPIC SPEC OTHER STN: NORMAL
PATH REPORT.RELEVANT HX SPEC: NORMAL
PHOTO IMAGE: NORMAL

## 2025-05-12 NOTE — PROGRESS NOTES
168 voice mail received from patient requesting results from recent breast biopsy.     Appears results have not yet been reviewed by provider and not yet released in [facility].  Results are as follows:    RIGHT breast, MRI-guided core biopsy:  -Breast tissue with abundant collagenous stroma  -Calcifications associated with benign acini  -Negative for atypia or malignancy     Recommendations: Six-month follow-up breast MRI.     Most recent office visit note states final plan pending results.  Forwarding to provider to advise.      Evelina Gonsalez RN

## 2025-05-12 NOTE — TELEPHONE ENCOUNTER
Spoke with patient regarding right breast biopsy results, which indicate negative for atypia or malignancy. Notified patient that the radiologist's recommendation is 6 month follow-up breast MRI. Patient will schedule. Patient verbalized understanding of these results and all questions answered to her satisfaction.

## 2025-05-13 NOTE — PROGRESS NOTES
Patient contacted, informed that biopsy report states negative for malignancy, however has not yet been reviewed by Dr. Mcintosh.  Advised report also recommends repeat breast MRI in 6 months, however, will await word from provider regarding recommended follow up.     Evelina Gonsalez RN

## 2025-05-15 NOTE — TELEPHONE ENCOUNTER
Great news on pathology report.    BJT 6 months w/ MR breast prior (for schedulers)    MR orders placed.    Clint Mcintosh MD.  
21.3

## 2025-05-21 ENCOUNTER — RESULTS FOLLOW-UP (OUTPATIENT)
Dept: ONCOLOGY | Facility: CLINIC | Age: 65
End: 2025-05-21

## 2025-07-26 ENCOUNTER — HEALTH MAINTENANCE LETTER (OUTPATIENT)
Age: 65
End: 2025-07-26

## (undated) DEVICE — DRAPE IOBAN INCISE 23X17" 6650EZ

## (undated) DEVICE — CLIP ETHICON LIGACLIP SM BLUE LT100

## (undated) DEVICE — BLADE KNIFE SURG 10 371110

## (undated) DEVICE — PACK MINOR SBA15MIFSE

## (undated) DEVICE — DRAPE U SPLIT 74X120" 29440

## (undated) DEVICE — PREP CHLORAPREP 26ML TINTED ORANGE  260815

## (undated) DEVICE — DRSG PRIMAPORE 03 1/8X6" 66000318

## (undated) DEVICE — NDL 30GA 1" 305128

## (undated) DEVICE — SUCTION CANISTER MEDIVAC LINER 1500ML W/LID 65651-515

## (undated) DEVICE — SOL WATER IRRIG 1000ML BOTTLE 07139-09

## (undated) DEVICE — ESU PENCIL SMOKE EVAC W/ROCKER SWITCH 0703-047-000

## (undated) DEVICE — GLOVE BIOGEL PI MICRO INDICATOR UNDERGLOVE SZ 6.0 48960

## (undated) DEVICE — COVER TRANSDUCER PROBE 610-637

## (undated) DEVICE — MARKER SKIN DOUBLE TIP W/FLEXI-RULER W/LABELS

## (undated) DEVICE — SURGICEL ABSORBABLE HEMOSTAT SNOW 4"X4" 2083

## (undated) DEVICE — SU VICRYL 3-0 SH 27" UND J416H

## (undated) DEVICE — ESU ELEC BLADE 2.75" COATED/INSULATED E1455

## (undated) DEVICE — MARKER MARGIN MARKER STD 6 COLOR SGL USE MMS6

## (undated) DEVICE — DRSG STERI STRIP 1/2X4" R1547

## (undated) DEVICE — GLOVE BIOGEL PI ULTRATOUCH G SZ 6.0 42160

## (undated) DEVICE — SU MONOCRYL 4-0 P-3 18" UND Y494G

## (undated) DEVICE — SET BREAST BIOPSY LOCALIZER 20 PROBE 8MM PENCIL 09-0006

## (undated) DEVICE — SYR BULB IRRIG DOVER 60 ML LATEX FREE 67000

## (undated) RX ORDER — FENTANYL CITRATE 50 UG/ML
INJECTION, SOLUTION INTRAMUSCULAR; INTRAVENOUS
Status: DISPENSED
Start: 2024-09-04

## (undated) RX ORDER — PROPOFOL 10 MG/ML
INJECTION, EMULSION INTRAVENOUS
Status: DISPENSED
Start: 2024-09-04

## (undated) RX ORDER — GLYCOPYRROLATE 0.2 MG/ML
INJECTION, SOLUTION INTRAMUSCULAR; INTRAVENOUS
Status: DISPENSED
Start: 2024-09-04

## (undated) RX ORDER — FENTANYL CITRATE 0.05 MG/ML
INJECTION, SOLUTION INTRAMUSCULAR; INTRAVENOUS
Status: DISPENSED
Start: 2024-09-04

## (undated) RX ORDER — ONDANSETRON 2 MG/ML
INJECTION INTRAMUSCULAR; INTRAVENOUS
Status: DISPENSED
Start: 2024-09-04

## (undated) RX ORDER — DEXAMETHASONE SODIUM PHOSPHATE 4 MG/ML
INJECTION, SOLUTION INTRA-ARTICULAR; INTRALESIONAL; INTRAMUSCULAR; INTRAVENOUS; SOFT TISSUE
Status: DISPENSED
Start: 2024-09-04

## (undated) RX ORDER — ACETAMINOPHEN 325 MG/1
TABLET ORAL
Status: DISPENSED
Start: 2024-09-04

## (undated) RX ORDER — CEFAZOLIN SODIUM 2 G/50ML
SOLUTION INTRAVENOUS
Status: DISPENSED
Start: 2024-09-04